# Patient Record
Sex: MALE | Race: WHITE | NOT HISPANIC OR LATINO | Employment: FULL TIME | URBAN - METROPOLITAN AREA
[De-identification: names, ages, dates, MRNs, and addresses within clinical notes are randomized per-mention and may not be internally consistent; named-entity substitution may affect disease eponyms.]

---

## 2021-10-19 ENCOUNTER — TELEPHONE (OUTPATIENT)
Dept: CARDIOLOGY | Facility: MEDICAL CENTER | Age: 56
End: 2021-10-19

## 2021-10-19 DIAGNOSIS — I47.10 SVT (SUPRAVENTRICULAR TACHYCARDIA) (HCC): ICD-10-CM

## 2021-10-19 NOTE — TELEPHONE ENCOUNTER
Medical records have been requested from [facility] in [place]. Requested records in regards to stress test results, ECG tracings, any recent cardiac related medical records and OV notes. Fax: [phone], Phone: [phone].    Fax confirmation has been received and sent to scan through [facility].

## 2021-10-19 NOTE — TELEPHONE ENCOUNTER
Referral to EP placed. Echo and stress test ordered.     LVM for patient to notify. Mailed orders to patient.

## 2021-10-19 NOTE — TELEPHONE ENCOUNTER
Received form back from [facility] stating that the patient is not found within database. Please advise. Thank you

## 2021-10-19 NOTE — TELEPHONE ENCOUNTER
Patient called back, scheduled with Dr. Pickett next week. He states he had a stress test last week at [facility], will send a task to Dr. Pickett's MA to request records.    Transferred patient to image scheduling to schedule echocardiogram.

## 2021-10-19 NOTE — TELEPHONE ENCOUNTER
Called [facility] medical records department, and after waiting on hold, representative came back on the line and said she found the patient's records and will have another representative call me back to confirm they have been sent to us.

## 2021-10-19 NOTE — TELEPHONE ENCOUNTER
----- Message from Ravinder Mac M.D. sent at 10/19/2021  6:45 AM PDT -----  Please make new patient consult with EP for this patient as requested by Taniya from [facility] for recurrent supraventricular tachycardia. If he could get an echocardiogram and myocardial perfusion imaging study prior to his consult, it would expedite care for him. Thanks.  JASKARAN

## 2021-10-22 NOTE — TELEPHONE ENCOUNTER
Haven't received records yet. Called medical records again [phone]. Representative said she would send the records to [phone]

## 2021-10-25 ENCOUNTER — OFFICE VISIT (OUTPATIENT)
Dept: CARDIOLOGY | Facility: MEDICAL CENTER | Age: 56
End: 2021-10-25
Payer: COMMERCIAL

## 2021-10-25 VITALS
HEIGHT: 73 IN | HEART RATE: 72 BPM | RESPIRATION RATE: 16 BRPM | OXYGEN SATURATION: 96 % | WEIGHT: 241 LBS | SYSTOLIC BLOOD PRESSURE: 140 MMHG | BODY MASS INDEX: 31.94 KG/M2 | DIASTOLIC BLOOD PRESSURE: 100 MMHG

## 2021-10-25 DIAGNOSIS — Z82.49 FHX: SVT (SUPRAVENTRICULAR TACHYCARDIA): ICD-10-CM

## 2021-10-25 LAB — EKG IMPRESSION: NORMAL

## 2021-10-25 PROCEDURE — 99204 OFFICE O/P NEW MOD 45 MIN: CPT | Performed by: INTERNAL MEDICINE

## 2021-10-25 PROCEDURE — 93000 ELECTROCARDIOGRAM COMPLETE: CPT | Performed by: INTERNAL MEDICINE

## 2021-10-25 RX ORDER — IBUPROFEN 200 MG
200 TABLET ORAL EVERY 6 HOURS PRN
COMMUNITY
End: 2021-11-20

## 2021-10-25 NOTE — PROGRESS NOTES
Arrhythmia Clinic Note (New patient)     DOS: 10/25/2021    Referring physician: Dr Mac    Chief complaint/Reason for consult: SVT    HPI: 56-year-old man with new onset SVT.  First episode was 2 weeks ago.  He went to the emergency room at [facility].  He was having incessant recurrent SVT and was started on amiodarone.  He was eventually taken to [place] because the regional beds were awful.  He was discharged without intervention there after a day or 2, per his report.  Unfortunately he was back in the emergency room a week later with recurrent SVT.  This was vagal sensitive.  He was then discharged on metoprolol and instructed to follow-up with cardiology.  He was taking metoprolol daily but now is taking it twice daily.  He has not had any events over the past week.    ROS (+ highlighted in bold):  Constitutional: Fevers/chills/fatigue/weightloss  HEENT: Blurry vision/eye pain/sore throat/hearing loss  Respiratory: Shortness of breath/cough  Cardiovascular: Chest pain/palpitations/edema/orthopnea/syncope  GI: Nausea/vomitting/diarrhea  MSK: Arthralgias/myagias/muscle weakness  Skin: Rash/sores  Neurological: Numbness/tremors/vertigo  Endocrine: Excessive thirst/polyuria/cold intolerance/heat intolerance  Psych: Depression/anxiety    No past medical history on file.    Past Surgical History:   Procedure Laterality Date   • OTHER ORTHOPEDIC SURGERY      right knee       Social History     Socioeconomic History   • Marital status:      Spouse name: Not on file   • Number of children: Not on file   • Years of education: Not on file   • Highest education level: Not on file   Occupational History   • Not on file   Tobacco Use   • Smoking status: Never Smoker   • Smokeless tobacco: Never Used   Vaping Use   • Vaping Use: Never used   Substance and Sexual Activity   • Alcohol use: Yes   • Drug use: Never   • Sexual activity: Not on file   Other Topics Concern   • Not on file   Social  "History Narrative   • Not on file     Social Determinants of Health     Financial Resource Strain:    • Difficulty of Paying Living Expenses:    Food Insecurity:    • Worried About Running Out of Food in the Last Year:    • Ran Out of Food in the Last Year:    Transportation Needs:    • Lack of Transportation (Medical):    • Lack of Transportation (Non-Medical):    Physical Activity:    • Days of Exercise per Week:    • Minutes of Exercise per Session:    Stress:    • Feeling of Stress :    Social Connections:    • Frequency of Communication with Friends and Family:    • Frequency of Social Gatherings with Friends and Family:    • Attends Jehovah's witness Services:    • Active Member of Clubs or Organizations:    • Attends Club or Organization Meetings:    • Marital Status:    Intimate Partner Violence:    • Fear of Current or Ex-Partner:    • Emotionally Abused:    • Physically Abused:    • Sexually Abused:        No family history on file.    No Known Allergies    Current Outpatient Medications   Medication Sig Dispense Refill   • ibuprofen (MOTRIN) 200 MG Tab Take 200 mg by mouth every 6 hours as needed.     • metoprolol tartrate (LOPRESSOR) 50 MG Tab Take 50 mg by mouth every day.     • aspirin (ASA) 81 MG Chew Tab chewable tablet Chew 81 mg every day.       No current facility-administered medications for this visit.       Physical Exam:  Vitals:    10/25/21 0822   BP: 140/100   BP Location: Left arm   Patient Position: Sitting   BP Cuff Size: Adult   Pulse: 72   Resp: 16   SpO2: 96%   Weight: 109 kg (241 lb)   Height: 1.854 m (6' 1\")     General appearance: NAD, conversant   Eyes: anicteric sclerae, moist conjunctivae; no lid-lag; PERRLA  HENT: Atraumatic; oropharynx clear with moist mucous membranes and no mucosal ulcerations; normal hard and soft palate  Neck: Trachea midline; FROM, supple, no thyromegaly or lymphadenopathy  Lungs: CTA, with normal respiratory effort and no intercostal retractions  CV: RRR, no " MRGs, no JVD   Abdomen: Soft, non-tender; no masses or HSM  Extremities: No peripheral edema or extremity lymphadenopathy  Skin: Normal temperature, turgor and texture; no rash, ulcers or subcutaneous nodules  Psych: Appropriate affect, alert and oriented to person, place and time    Data:  Lipids:   No results found for: CHOLSTRLTOT, TRIGLYCERIDE, HDL, LDL     BMP:  Lab Results   Component Value Date/Time    SODIUM 141 10/19/2021 0502    POTASSIUM 4.2 10/19/2021 0502    CHLORIDE 106 10/19/2021 0502    CO2 23 10/19/2021 0502    GLUCOSE 140 (H) 10/19/2021 0502    BUN 17 10/19/2021 0502    CREATININE 1.0 10/19/2021 0502    CALCIUM 8.9 10/19/2021 0502    ANION 16 10/19/2021 0502        TSH:   Lab Results   Component Value Date/Time    TSHULTRASEN 7.01 (H) 10/11/2021 0750        THYROXINE (T4):   No results found for: FREEDIR     CBC:   Lab Results   Component Value Date/Time    WBC 8.6 10/19/2021 05:02 AM    RBC 4.82 10/19/2021 05:02 AM    HEMOGLOBIN 14.5 10/19/2021 05:02 AM    HEMATOCRIT 43.5 10/19/2021 05:02 AM    MCV 90.2 10/19/2021 05:02 AM    MCH 30.1 10/19/2021 05:02 AM    MCHC 33.3 10/19/2021 05:02 AM    RDW 13.4 10/19/2021 05:02 AM    PLATELETCT 241 10/19/2021 05:02 AM    MPV 9.4 10/19/2021 05:02 AM        CBC w/o DIFF  Lab Results   Component Value Date/Time    WBC 8.6 10/19/2021 05:02 AM    RBC 4.82 10/19/2021 05:02 AM    HEMOGLOBIN 14.5 10/19/2021 05:02 AM    MCV 90.2 10/19/2021 05:02 AM    MCH 30.1 10/19/2021 05:02 AM    MCHC 33.3 10/19/2021 05:02 AM    RDW 13.4 10/19/2021 05:02 AM    MPV 9.4 10/19/2021 05:02 AM         EKG interpreted by me: EKG dated 10/11/2021 individually interpreted by me: SVT    EKG today interpreted by me: Normal sinus rhythm    Impression/Plan:  1.  Paroxysmal SVT    -We discussed treatment options for this at length.  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  We discussed indefinite beta-blocker therapy for suppression of arrhythmia, versus electrophysiology study and catheter ablation.  He would  potentially like to not take medications long-term but would like to discuss this with his wife.  Risks and benefits were discussed. Risks include vascular access bleeding or pain, infection, stroke, myocardial infarction, cardiac tamponade, pericardial effusion, myocardial perforation, major bleeding, phrenic nerve damage, heart block requiring a pacemaker, and death. Risk of major adverse event is ~1%. Success rates long term are 80-95% depending on the arrhythmia induced and the acute success in the EP lab.    Ultimately he does believe he would like to schedule a catheter ablation, and he will call us when he is ready to schedule.  If he has a change of heart however, I will see him again in 3 or 4 months.    Michael Pickett MD  Cardiac Electrophysiology

## 2021-10-27 ENCOUNTER — TELEPHONE (OUTPATIENT)
Dept: CARDIOLOGY | Facility: MEDICAL CENTER | Age: 56
End: 2021-10-27

## 2021-10-27 NOTE — TELEPHONE ENCOUNTER
Dr. Pickett,    This patient would like to proceed with schedule the ablation discussed in your last office visit. Are there any medications you would like this patient to hold for this SVT ablation?    Thank You,  Ela

## 2021-10-28 DIAGNOSIS — Z82.49 FHX: SVT (SUPRAVENTRICULAR TACHYCARDIA): ICD-10-CM

## 2021-10-28 NOTE — TELEPHONE ENCOUNTER
Michael Pickett M.D.  Ela Jimenez, Med Ass't  Caller: Unspecified (Yesterday,  1:16 PM)  Please hold metoprolol 5 days thanks

## 2021-10-28 NOTE — TELEPHONE ENCOUNTER
Patient scheduled for SVT ablation on 12-3-21 with Dr. Pickett. Patient has been instructed to check in at 7:30 for 9:30 case time. Hold Metoprolol 5 days before. Message sent to authorizations. Emailed Carto.

## 2021-11-17 ENCOUNTER — PRE-ADMISSION TESTING (OUTPATIENT)
Dept: ADMISSIONS | Facility: MEDICAL CENTER | Age: 56
End: 2021-11-17
Attending: INTERNAL MEDICINE
Payer: COMMERCIAL

## 2021-11-19 ENCOUNTER — TELEPHONE (OUTPATIENT)
Dept: CARDIOLOGY | Facility: MEDICAL CENTER | Age: 56
End: 2021-11-19

## 2021-11-19 NOTE — TELEPHONE ENCOUNTER
Dr. Pickett,    This patient is scheduled for a SVT ablation with you on 12-3-21. He has been instructed to hold his Metoprolol 5 days before. He wanted to let you know that he did have another episode yesterday that took him to the hospital. He was running 194, on the Metoprolol. This is happening, do you still want him to hold the Metoprolol before this procedure?    Please advise.    Thank You,  Ela

## 2021-11-21 ENCOUNTER — PATIENT MESSAGE (OUTPATIENT)
Dept: CARDIOLOGY | Facility: MEDICAL CENTER | Age: 56
End: 2021-11-21

## 2021-11-22 NOTE — TELEPHONE ENCOUNTER
Michael Pickett M.D.  Ela Jimenez, Med Ass't  Caller: Unspecified (3 days ago,  9:58 AM)  Let's hold metoprolol just 1 day thanks

## 2021-11-22 NOTE — TELEPHONE ENCOUNTER
LM on patient's voicemail notifying him to hold the Metoprolol only 1 day before the procedure now.Requeted a call back with any questions.

## 2021-12-03 ENCOUNTER — APPOINTMENT (OUTPATIENT)
Dept: CARDIOLOGY | Facility: MEDICAL CENTER | Age: 56
End: 2021-12-03
Attending: INTERNAL MEDICINE
Payer: COMMERCIAL

## 2021-12-03 ENCOUNTER — HOSPITAL ENCOUNTER (OUTPATIENT)
Facility: MEDICAL CENTER | Age: 56
End: 2021-12-03
Attending: INTERNAL MEDICINE | Admitting: INTERNAL MEDICINE
Payer: COMMERCIAL

## 2021-12-03 VITALS
DIASTOLIC BLOOD PRESSURE: 66 MMHG | RESPIRATION RATE: 24 BRPM | WEIGHT: 233.25 LBS | OXYGEN SATURATION: 94 % | HEIGHT: 73 IN | SYSTOLIC BLOOD PRESSURE: 117 MMHG | TEMPERATURE: 97.6 F | HEART RATE: 79 BPM | BODY MASS INDEX: 30.91 KG/M2

## 2021-12-03 DIAGNOSIS — Z82.49 FHX: SVT (SUPRAVENTRICULAR TACHYCARDIA): ICD-10-CM

## 2021-12-03 LAB
ALBUMIN SERPL BCP-MCNC: 4.7 G/DL (ref 3.2–4.9)
ALBUMIN/GLOB SERPL: 2.4 G/DL
ALP SERPL-CCNC: 86 U/L (ref 30–99)
ALT SERPL-CCNC: 17 U/L (ref 2–50)
ANION GAP SERPL CALC-SCNC: 10 MMOL/L (ref 7–16)
AST SERPL-CCNC: 10 U/L (ref 12–45)
BASOPHILS # BLD AUTO: 0.2 % (ref 0–1.8)
BASOPHILS # BLD: 0.02 K/UL (ref 0–0.12)
BILIRUB SERPL-MCNC: 0.9 MG/DL (ref 0.1–1.5)
BUN SERPL-MCNC: 21 MG/DL (ref 8–22)
CALCIUM SERPL-MCNC: 9.5 MG/DL (ref 8.5–10.5)
CHLORIDE SERPL-SCNC: 105 MMOL/L (ref 96–112)
CO2 SERPL-SCNC: 27 MMOL/L (ref 20–33)
CREAT SERPL-MCNC: 0.87 MG/DL (ref 0.5–1.4)
EKG IMPRESSION: NORMAL
EOSINOPHIL # BLD AUTO: 0.06 K/UL (ref 0–0.51)
EOSINOPHIL NFR BLD: 0.6 % (ref 0–6.9)
ERYTHROCYTE [DISTWIDTH] IN BLOOD BY AUTOMATED COUNT: 46.9 FL (ref 35.9–50)
EXTERNAL QUALITY CONTROL: NORMAL
GLOBULIN SER CALC-MCNC: 2 G/DL (ref 1.9–3.5)
GLUCOSE SERPL-MCNC: 115 MG/DL (ref 65–99)
HCT VFR BLD AUTO: 43.2 % (ref 42–52)
HGB BLD-MCNC: 14.5 G/DL (ref 14–18)
IMM GRANULOCYTES # BLD AUTO: 0.02 K/UL (ref 0–0.11)
IMM GRANULOCYTES NFR BLD AUTO: 0.2 % (ref 0–0.9)
INR PPP: 1.05 (ref 0.87–1.13)
LYMPHOCYTES # BLD AUTO: 2.51 K/UL (ref 1–4.8)
LYMPHOCYTES NFR BLD: 24.8 % (ref 22–41)
MCH RBC QN AUTO: 30.5 PG (ref 27–33)
MCHC RBC AUTO-ENTMCNC: 33.6 G/DL (ref 33.7–35.3)
MCV RBC AUTO: 90.9 FL (ref 81.4–97.8)
MONOCYTES # BLD AUTO: 0.77 K/UL (ref 0–0.85)
MONOCYTES NFR BLD AUTO: 7.6 % (ref 0–13.4)
NEUTROPHILS # BLD AUTO: 6.75 K/UL (ref 1.82–7.42)
NEUTROPHILS NFR BLD: 66.6 % (ref 44–72)
NRBC # BLD AUTO: 0 K/UL
NRBC BLD-RTO: 0 /100 WBC
PLATELET # BLD AUTO: 203 K/UL (ref 164–446)
PMV BLD AUTO: 9.5 FL (ref 9–12.9)
POTASSIUM SERPL-SCNC: 4.2 MMOL/L (ref 3.6–5.5)
PROT SERPL-MCNC: 6.7 G/DL (ref 6–8.2)
PROTHROMBIN TIME: 13.4 SEC (ref 12–14.6)
RBC # BLD AUTO: 4.75 M/UL (ref 4.7–6.1)
SARS-COV+SARS-COV-2 AG RESP QL IA.RAPID: NEGATIVE
SODIUM SERPL-SCNC: 142 MMOL/L (ref 135–145)
WBC # BLD AUTO: 10.1 K/UL (ref 4.8–10.8)

## 2021-12-03 PROCEDURE — 93623 PRGRMD STIMJ&PACG IV RX NFS: CPT

## 2021-12-03 PROCEDURE — 93621 COMP EP EVL L PAC&REC C SINS: CPT | Mod: 26 | Performed by: INTERNAL MEDICINE

## 2021-12-03 PROCEDURE — 93010 ELECTROCARDIOGRAM REPORT: CPT | Performed by: INTERNAL MEDICINE

## 2021-12-03 PROCEDURE — 93662 INTRACARDIAC ECG (ICE): CPT | Mod: 26 | Performed by: INTERNAL MEDICINE

## 2021-12-03 PROCEDURE — 93653 COMPRE EP EVAL TX SVT: CPT | Performed by: INTERNAL MEDICINE

## 2021-12-03 PROCEDURE — 93613 INTRACARDIAC EPHYS 3D MAPG: CPT | Performed by: INTERNAL MEDICINE

## 2021-12-03 PROCEDURE — 700101 HCHG RX REV CODE 250

## 2021-12-03 PROCEDURE — 85610 PROTHROMBIN TIME: CPT

## 2021-12-03 PROCEDURE — 160002 HCHG RECOVERY MINUTES (STAT)

## 2021-12-03 PROCEDURE — 93005 ELECTROCARDIOGRAM TRACING: CPT | Performed by: INTERNAL MEDICINE

## 2021-12-03 PROCEDURE — 93655 ICAR CATH ABLTJ DSCRT ARRHYT: CPT | Performed by: INTERNAL MEDICINE

## 2021-12-03 PROCEDURE — 80053 COMPREHEN METABOLIC PANEL: CPT

## 2021-12-03 PROCEDURE — 700111 HCHG RX REV CODE 636 W/ 250 OVERRIDE (IP)

## 2021-12-03 PROCEDURE — 99152 MOD SED SAME PHYS/QHP 5/>YRS: CPT | Performed by: INTERNAL MEDICINE

## 2021-12-03 PROCEDURE — 85025 COMPLETE CBC W/AUTO DIFF WBC: CPT

## 2021-12-03 PROCEDURE — 93623 PRGRMD STIMJ&PACG IV RX NFS: CPT | Mod: 26 | Performed by: INTERNAL MEDICINE

## 2021-12-03 PROCEDURE — 87426 SARSCOV CORONAVIRUS AG IA: CPT | Performed by: INTERNAL MEDICINE

## 2021-12-03 RX ORDER — MIDAZOLAM HYDROCHLORIDE 1 MG/ML
INJECTION INTRAMUSCULAR; INTRAVENOUS
Status: COMPLETED
Start: 2021-12-03 | End: 2021-12-03

## 2021-12-03 RX ORDER — HEPARIN SODIUM 200 [USP'U]/100ML
INJECTION, SOLUTION INTRAVENOUS
Status: COMPLETED
Start: 2021-12-03 | End: 2021-12-03

## 2021-12-03 RX ORDER — ISOPROTERENOL HYDROCHLORIDE 0.2 MG/ML
INJECTION, SOLUTION INTRAVENOUS
Status: COMPLETED
Start: 2021-12-03 | End: 2021-12-03

## 2021-12-03 RX ORDER — M-VIT,TX,IRON,MINS/CALC/FOLIC 27MG-0.4MG
1 TABLET ORAL DAILY
Status: ON HOLD | COMMUNITY
End: 2021-12-07

## 2021-12-03 RX ORDER — BUPIVACAINE HYDROCHLORIDE 2.5 MG/ML
INJECTION, SOLUTION EPIDURAL; INFILTRATION; INTRACAUDAL
Status: COMPLETED
Start: 2021-12-03 | End: 2021-12-03

## 2021-12-03 RX ORDER — LIDOCAINE HYDROCHLORIDE 20 MG/ML
INJECTION, SOLUTION INFILTRATION; PERINEURAL
Status: COMPLETED
Start: 2021-12-03 | End: 2021-12-03

## 2021-12-03 RX ADMIN — HEPARIN SODIUM 2000 UNITS: 200 INJECTION, SOLUTION INTRAVENOUS at 11:45

## 2021-12-03 RX ADMIN — MIDAZOLAM HYDROCHLORIDE 2 MG: 1 INJECTION, SOLUTION INTRAMUSCULAR; INTRAVENOUS at 11:52

## 2021-12-03 RX ADMIN — MIDAZOLAM HYDROCHLORIDE 2 MG: 1 INJECTION, SOLUTION INTRAMUSCULAR; INTRAVENOUS at 11:23

## 2021-12-03 RX ADMIN — LIDOCAINE HYDROCHLORIDE: 20 INJECTION, SOLUTION INFILTRATION; PERINEURAL at 11:23

## 2021-12-03 RX ADMIN — MIDAZOLAM HYDROCHLORIDE 2 MG: 1 INJECTION, SOLUTION INTRAMUSCULAR; INTRAVENOUS at 12:53

## 2021-12-03 RX ADMIN — ISOPROTERENOL HYDROCHLORIDE 200 MCG: 0.2 INJECTION, SOLUTION INTRAMUSCULAR; INTRAVENOUS at 11:42

## 2021-12-03 RX ADMIN — MIDAZOLAM HYDROCHLORIDE 2 MG: 1 INJECTION, SOLUTION INTRAMUSCULAR; INTRAVENOUS at 12:26

## 2021-12-03 RX ADMIN — HEPARIN SODIUM 2000 UNITS: 200 INJECTION, SOLUTION INTRAVENOUS at 11:43

## 2021-12-03 RX ADMIN — FENTANYL CITRATE 100 MCG: 50 INJECTION, SOLUTION INTRAMUSCULAR; INTRAVENOUS at 11:23

## 2021-12-03 RX ADMIN — BUPIVACAINE HYDROCHLORIDE: 2.5 INJECTION, SOLUTION EPIDURAL; INFILTRATION; INTRACAUDAL; PERINEURAL at 11:23

## 2021-12-03 RX ADMIN — FENTANYL CITRATE 100 MCG: 50 INJECTION, SOLUTION INTRAMUSCULAR; INTRAVENOUS at 12:26

## 2021-12-03 RX ADMIN — FENTANYL CITRATE 100 MCG: 50 INJECTION, SOLUTION INTRAMUSCULAR; INTRAVENOUS at 13:13

## 2021-12-03 RX ADMIN — FENTANYL CITRATE 100 MCG: 50 INJECTION, SOLUTION INTRAMUSCULAR; INTRAVENOUS at 11:52

## 2021-12-03 ASSESSMENT — FIBROSIS 4 INDEX: FIB4 SCORE: 1.08

## 2021-12-03 NOTE — PROGRESS NOTES
Med rec completed per PT at bedside  Interviewed PT with family at bedside with permission  Allergies reviewed and updated

## 2021-12-03 NOTE — H&P
[facility]  Electrophysiology Pre-procedure H&P    DOS:12/3/2021    Planned Procedure: SVT ablation    Chief complaint/Reason for Procedure: SVT    HPI: 57 y/o M with PSVT for EPS/RFA      Past Medical History:   Diagnosis Date   • Arrhythmia 11/17/2021    SVT   • Breath shortness 11/17/2021    SOB while sleeping   • Indigestion 11/17/2021    occ   • Pain 11/17/2021    L5-S1 disc   • Sleep apnea     Appointment on 11/23/21       Past Surgical History:   Procedure Laterality Date   • OTHER ORTHOPEDIC SURGERY      right knee       Social History     Socioeconomic History   • Marital status:      Spouse name: Not on file   • Number of children: Not on file   • Years of education: Not on file   • Highest education level: Not on file   Occupational History   • Not on file   Tobacco Use   • Smoking status: Never Smoker   • Smokeless tobacco: Never Used   Vaping Use   • Vaping Use: Never used   Substance and Sexual Activity   • Alcohol use: Yes     Comment: 6-8 per week   • Drug use: Never   • Sexual activity: Not on file   Other Topics Concern   • Not on file   Social History Narrative   • Not on file     Social Determinants of Health     Financial Resource Strain:    • Difficulty of Paying Living Expenses: Not on file   Food Insecurity:    • Worried About Running Out of Food in the Last Year: Not on file   • Ran Out of Food in the Last Year: Not on file   Transportation Needs:    • Lack of Transportation (Medical): Not on file   • Lack of Transportation (Non-Medical): Not on file   Physical Activity:    • Days of Exercise per Week: Not on file   • Minutes of Exercise per Session: Not on file   Stress:    • Feeling of Stress : Not on file   Social Connections:    • Frequency of Communication with Friends and Family: Not on file   • Frequency of Social Gatherings with Friends and Family: Not on file   • Attends Jewish Services: Not on file   • Active Member of Clubs or Organizations: Not on  "file   • Attends Club or Organization Meetings: Not on file   • Marital Status: Not on file   Intimate Partner Violence:    • Fear of Current or Ex-Partner: Not on file   • Emotionally Abused: Not on file   • Physically Abused: Not on file   • Sexually Abused: Not on file   Housing Stability:    • Unable to Pay for Housing in the Last Year: Not on file   • Number of Places Lived in the Last Year: Not on file   • Unstable Housing in the Last Year: Not on file       History reviewed. No pertinent family history.    No Known Allergies    No current facility-administered medications for this encounter.       Physical Exam:  Vitals:    12/03/21 0742   BP: 112/77   Pulse: 81   Resp: 16   Temp: 36.2 °C (97.1 °F)   TempSrc: Temporal   SpO2: 96%   Weight: 106 kg (233 lb 4 oz)   Height: 1.854 m (6' 1\")     General appearance: NAD, conversant   Neck: Trachea midline; FROM, supple, no thyromegaly or lymphadenopathy  CV: RRR, no MRGs, no JVD   Extremities: No peripheral edema or extremity lymphadenopathy  Skin: Normal temperature, turgor and texture; no rash, ulcers or subcutaneous nodules  Psych: Appropriate affect, alert and oriented to person, place and time    Data:  No results found for: CHOLSTRLTOT, LDL, HDL, TRIGLYCERIDE    Lab Results   Component Value Date/Time    SODIUM 142 12/03/2021 08:20 AM    POTASSIUM 4.2 12/03/2021 08:20 AM    CHLORIDE 105 12/03/2021 08:20 AM    CO2 27 12/03/2021 08:20 AM    GLUCOSE 115 (H) 12/03/2021 08:20 AM    BUN 21 12/03/2021 08:20 AM    CREATININE 0.87 12/03/2021 08:20 AM     Lab Results   Component Value Date/Time    ALKPHOSPHAT 86 12/03/2021 08:20 AM    ASTSGOT 10 (L) 12/03/2021 08:20 AM    ALTSGPT 17 12/03/2021 08:20 AM    TBILIRUBIN 0.9 12/03/2021 08:20 AM      No results found for: BNPBTYPENAT      Recent Labs     12/03/21  0820   WBC 10.1   RBC 4.75   HEMOGLOBIN 14.5   HEMATOCRIT 43.2   MCV 90.9   MCH 30.5   MCHC 33.6*   RDW 46.9   PLATELETCT 203   MPV 9.5       EKG interpreted by me: " NSR    Impression/Plan:  1) pSVT    Plan EPS/RFA, Risks include vascular access bleeding or pain, infection, stroke, myocardial infarction, cardiac tamponade, pericardial effusion, myocardial perforation, major bleeding, phrenic nerve damage, heart block requiring a pacemaker, and death. Risk of major adverse event is ~1%. Success rates long term are 80-95% depending on the arrhythmia induced and the acute success in the EP lab.        Michael Pickett MD  Cardiac Electrophysiology

## 2021-12-03 NOTE — OR NURSING
1329: Pt arrived from cath lab post ablation. Pt is awake, alert, and oriented. R groin sight is CDI; pt on bedrest. Cardiac rhythm appears to be SR. Pt denies pain or nausea.     1445: Left VM with callback for pt's wife. Pt resting. RN updated YORDAN Jalloh on pt status and location.     1500: Tacoseh to bedside. Discussed POC.     1522: Pt up to 30 degrees and tolerating food. Awaiting phase II room.     1600: Report to Coreen; pt to phase II room 30 via milady with RN.

## 2021-12-04 NOTE — OR NURSING
Discharge order in place. Pt's VSS; denies N/V; states pain is at tolerable level. Dressing C/D/I to R groin. Pt up and ambulated to bathroom, steady gait, voided adequately. Discharge instructions given; pt and family verbalized understanding and questions answered. Pt changed into clothing with assistance. IV d/c'd. Patient states ready to d/c home. No prescriptions sent to pharmacy. Pt dc'd home in wheelchair by CNA.

## 2021-12-04 NOTE — DISCHARGE INSTRUCTIONS
ACTIVITY: Rest and take it easy for the first 24 hours.  A responsible adult is recommended to remain with you during that time.  It is normal to feel sleepy.  We encourage you to not do anything that requires balance, judgment or coordination.    MILD FLU-LIKE SYMPTOMS ARE NORMAL. YOU MAY EXPERIENCE GENERALIZED MUSCLE ACHES, THROAT IRRITATION, HEADACHE AND/OR SOME NAUSEA.    FOR 24 HOURS DO NOT:  Drive, operate machinery or run household appliances.  Drink beer or alcoholic beverages.   Make important decisions or sign legal documents.    Post Ablation Instructions:   No lifting > 10 lbs x 1 week.    No baths or hot tubs x 1 week.   May shower tomorrow and take off dressings.    Continue to monitor sites daily for warmth, redness, or discolored drainage.    Please walk and take deep breaths.   After discharge if you experience chest pain, shortness of breath, coughing up blood, stroke symptoms, fever > 101F, passing out/near passing out, or trouble with the catheter sites, please be seen in the emergency dept.  Please call our office at [phone] for atrial fibrillation lasting longer than 2 hours or if you develop signs or symptoms of a urinary tract infection (pain or burning during urination, frequency, urgency, drainage, fever).    DIET: To avoid nausea, slowly advance diet as tolerated, avoiding spicy or greasy foods for the first day.  Add more substantial food to your diet according to your physician's instructions.  Babies can be fed formula or breast milk as soon as they are hungry.  INCREASE FLUIDS AND FIBER TO AVOID CONSTIPATION.    FOLLOW-UP APPOINTMENT:  A follow-up appointment should be arranged with your doctor; call to schedule.    You should CALL YOUR PHYSICIAN if you develop:  Fever greater than 101 degrees F.  Pain not relieved by medication, or persistent nausea or vomiting.  Excessive bleeding (blood soaking through dressing) or unexpected drainage from the wound.  Extreme redness or  swelling around the incision site, drainage of pus or foul smelling drainage.  Inability to urinate or empty your bladder within 8 hours.  Problems with breathing or chest pain.    You should call 911 if you develop problems with breathing or chest pain.  If you are unable to contact your doctor or surgical center, you should go to the nearest emergency room or urgent care center.    If any questions arise, call your doctor.  If your doctor is not available, please feel free to call the Surgical Center at [phone].     A registered nurse may call you a few days after your surgery to see how you are doing after your procedure.    MEDICATIONS: Resume taking daily medication.  Take prescribed pain medication with food.  If no medication is prescribed, you may take non-aspirin pain medication if needed.  PAIN MEDICATION CAN BE VERY CONSTIPATING.  Take a stool softener or laxative such as senokot, pericolace, or milk of magnesia if needed.    Depression / Suicide Risk    As you are discharged from this [facility] facility, it is important to learn how to keep safe from harming yourself.    Recognize the warning signs:  · Abrupt changes in personality, positive or negative- including increase in energy   · Giving away possessions  · Change in eating patterns- significant weight changes-  positive or negative  · Change in sleeping patterns- unable to sleep or sleeping all the time   · Unwillingness or inability to communicate  · Depression  · Unusual sadness, discouragement and loneliness  · Talk of wanting to die  · Neglect of personal appearance   · Rebelliousness- reckless behavior  · Withdrawal from people/activities they love  · Confusion- inability to concentrate     If you or a loved one observes any of these behaviors or has concerns about self-harm, here's what you can do:  · Talk about it- your feelings and reasons for harming yourself  · Remove any means that you might use to hurt yourself (examples:  pills, rope, extension cords, firearm)  · Get professional help from the community (Mental Health, Substance Abuse, psychological counseling)  · Do not be alone:Call your Safe Contact- someone whom you trust who will be there for you.  · Call your local CRISIS HOTLINE 725-1195 or 435-610-0131  · Call your local Children's Mobile Crisis Response Team [place] (045) 146-5968 or www.[employer]  · Call the toll free National Suicide Prevention Hotlines   · National Suicide Prevention Lifeline 969-518-MIEN (1288)  · National Hope Line Network 800-SUICIDE (367-2713)

## 2021-12-05 ENCOUNTER — TELEPHONE (OUTPATIENT)
Dept: CARDIOLOGY | Facility: MEDICAL CENTER | Age: 56
End: 2021-12-05

## 2021-12-05 ENCOUNTER — HOSPITAL ENCOUNTER (INPATIENT)
Facility: MEDICAL CENTER | Age: 56
LOS: 2 days | DRG: 310 | End: 2021-12-07
Attending: STUDENT IN AN ORGANIZED HEALTH CARE EDUCATION/TRAINING PROGRAM | Admitting: STUDENT IN AN ORGANIZED HEALTH CARE EDUCATION/TRAINING PROGRAM
Payer: COMMERCIAL

## 2021-12-05 PROCEDURE — 770020 HCHG ROOM/CARE - TELE (206)

## 2021-12-05 ASSESSMENT — FIBROSIS 4 INDEX: FIB4 SCORE: 1.98

## 2021-12-06 ENCOUNTER — APPOINTMENT (OUTPATIENT)
Dept: CARDIOLOGY | Facility: MEDICAL CENTER | Age: 56
DRG: 310 | End: 2021-12-06
Attending: STUDENT IN AN ORGANIZED HEALTH CARE EDUCATION/TRAINING PROGRAM
Payer: COMMERCIAL

## 2021-12-06 ENCOUNTER — TELEPHONE (OUTPATIENT)
Dept: CARDIOLOGY | Facility: MEDICAL CENTER | Age: 56
End: 2021-12-06

## 2021-12-06 PROBLEM — I95.9 HYPOTENSION: Status: ACTIVE | Noted: 2021-12-06

## 2021-12-06 PROBLEM — I49.9 ARRHYTHMIA: Status: ACTIVE | Noted: 2021-12-06

## 2021-12-06 PROBLEM — I47.10 SVT (SUPRAVENTRICULAR TACHYCARDIA) (HCC): Status: ACTIVE | Noted: 2021-12-06

## 2021-12-06 LAB
ALBUMIN SERPL BCP-MCNC: 4.1 G/DL (ref 3.2–4.9)
ALBUMIN/GLOB SERPL: 1.9 G/DL
ALP SERPL-CCNC: 151 U/L (ref 30–99)
ALT SERPL-CCNC: 58 U/L (ref 2–50)
ANION GAP SERPL CALC-SCNC: 10 MMOL/L (ref 7–16)
AST SERPL-CCNC: 32 U/L (ref 12–45)
BILIRUB SERPL-MCNC: 0.5 MG/DL (ref 0.1–1.5)
BUN SERPL-MCNC: 19 MG/DL (ref 8–22)
CALCIUM SERPL-MCNC: 8.9 MG/DL (ref 8.5–10.5)
CHLORIDE SERPL-SCNC: 108 MMOL/L (ref 96–112)
CO2 SERPL-SCNC: 22 MMOL/L (ref 20–33)
CREAT SERPL-MCNC: 0.7 MG/DL (ref 0.5–1.4)
ERYTHROCYTE [DISTWIDTH] IN BLOOD BY AUTOMATED COUNT: 46.8 FL (ref 35.9–50)
EST. AVERAGE GLUCOSE BLD GHB EST-MCNC: 103 MG/DL
GLOBULIN SER CALC-MCNC: 2.2 G/DL (ref 1.9–3.5)
GLUCOSE BLD-MCNC: 101 MG/DL (ref 65–99)
GLUCOSE BLD-MCNC: 111 MG/DL (ref 65–99)
GLUCOSE BLD-MCNC: 147 MG/DL (ref 65–99)
GLUCOSE BLD-MCNC: 95 MG/DL (ref 65–99)
GLUCOSE BLD-MCNC: 97 MG/DL (ref 65–99)
GLUCOSE SERPL-MCNC: 111 MG/DL (ref 65–99)
HBA1C MFR BLD: 5.2 % (ref 4–5.6)
HCT VFR BLD AUTO: 37.4 % (ref 42–52)
HGB BLD-MCNC: 12.8 G/DL (ref 14–18)
LV EJECT FRACT  99904: 60
LV EJECT FRACT MOD 2C 99903: 60.83
LV EJECT FRACT MOD 4C 99902: 62.5
LV EJECT FRACT MOD BP 99901: 60.31
MCH RBC QN AUTO: 30.8 PG (ref 27–33)
MCHC RBC AUTO-ENTMCNC: 34.2 G/DL (ref 33.7–35.3)
MCV RBC AUTO: 90.1 FL (ref 81.4–97.8)
NT-PROBNP SERPL IA-MCNC: 1961 PG/ML (ref 0–125)
PLATELET # BLD AUTO: 185 K/UL (ref 164–446)
PMV BLD AUTO: 9.6 FL (ref 9–12.9)
POTASSIUM SERPL-SCNC: 4.1 MMOL/L (ref 3.6–5.5)
PROT SERPL-MCNC: 6.3 G/DL (ref 6–8.2)
RBC # BLD AUTO: 4.15 M/UL (ref 4.7–6.1)
SODIUM SERPL-SCNC: 140 MMOL/L (ref 135–145)
T4 FREE SERPL-MCNC: 1.19 NG/DL (ref 0.93–1.7)
TROPONIN T SERPL-MCNC: 54 NG/L (ref 6–19)
TROPONIN T SERPL-MCNC: 55 NG/L (ref 6–19)
TROPONIN T SERPL-MCNC: 63 NG/L (ref 6–19)
TSH SERPL DL<=0.005 MIU/L-ACNC: 4.64 UIU/ML (ref 0.38–5.33)
WBC # BLD AUTO: 8.6 K/UL (ref 4.8–10.8)

## 2021-12-06 PROCEDURE — 770020 HCHG ROOM/CARE - TELE (206)

## 2021-12-06 PROCEDURE — 93306 TTE W/DOPPLER COMPLETE: CPT

## 2021-12-06 PROCEDURE — A9270 NON-COVERED ITEM OR SERVICE: HCPCS | Performed by: STUDENT IN AN ORGANIZED HEALTH CARE EDUCATION/TRAINING PROGRAM

## 2021-12-06 PROCEDURE — 700102 HCHG RX REV CODE 250 W/ 637 OVERRIDE(OP): Performed by: PHYSICIAN ASSISTANT

## 2021-12-06 PROCEDURE — 93306 TTE W/DOPPLER COMPLETE: CPT | Mod: 26 | Performed by: INTERNAL MEDICINE

## 2021-12-06 PROCEDURE — 97535 SELF CARE MNGMENT TRAINING: CPT

## 2021-12-06 PROCEDURE — 700102 HCHG RX REV CODE 250 W/ 637 OVERRIDE(OP): Performed by: STUDENT IN AN ORGANIZED HEALTH CARE EDUCATION/TRAINING PROGRAM

## 2021-12-06 PROCEDURE — 99232 SBSQ HOSP IP/OBS MODERATE 35: CPT | Mod: GC | Performed by: HOSPITALIST

## 2021-12-06 PROCEDURE — 84484 ASSAY OF TROPONIN QUANT: CPT

## 2021-12-06 PROCEDURE — 83036 HEMOGLOBIN GLYCOSYLATED A1C: CPT

## 2021-12-06 PROCEDURE — 80053 COMPREHEN METABOLIC PANEL: CPT

## 2021-12-06 PROCEDURE — 84439 ASSAY OF FREE THYROXINE: CPT

## 2021-12-06 PROCEDURE — A9270 NON-COVERED ITEM OR SERVICE: HCPCS | Performed by: PHYSICIAN ASSISTANT

## 2021-12-06 PROCEDURE — 99223 1ST HOSP IP/OBS HIGH 75: CPT | Performed by: STUDENT IN AN ORGANIZED HEALTH CARE EDUCATION/TRAINING PROGRAM

## 2021-12-06 PROCEDURE — 82962 GLUCOSE BLOOD TEST: CPT | Mod: 91

## 2021-12-06 PROCEDURE — 85027 COMPLETE CBC AUTOMATED: CPT

## 2021-12-06 PROCEDURE — 84443 ASSAY THYROID STIM HORMONE: CPT

## 2021-12-06 PROCEDURE — 700111 HCHG RX REV CODE 636 W/ 250 OVERRIDE (IP): Performed by: STUDENT IN AN ORGANIZED HEALTH CARE EDUCATION/TRAINING PROGRAM

## 2021-12-06 PROCEDURE — 83880 ASSAY OF NATRIURETIC PEPTIDE: CPT

## 2021-12-06 PROCEDURE — 36415 COLL VENOUS BLD VENIPUNCTURE: CPT

## 2021-12-06 PROCEDURE — 99222 1ST HOSP IP/OBS MODERATE 55: CPT | Performed by: STUDENT IN AN ORGANIZED HEALTH CARE EDUCATION/TRAINING PROGRAM

## 2021-12-06 RX ORDER — METOPROLOL TARTRATE 1 MG/ML
5 INJECTION, SOLUTION INTRAVENOUS
Status: DISCONTINUED | OUTPATIENT
Start: 2021-12-06 | End: 2021-12-07 | Stop reason: HOSPADM

## 2021-12-06 RX ORDER — BISACODYL 10 MG
10 SUPPOSITORY, RECTAL RECTAL
Status: DISCONTINUED | OUTPATIENT
Start: 2021-12-06 | End: 2021-12-07 | Stop reason: HOSPADM

## 2021-12-06 RX ORDER — PROMETHAZINE HYDROCHLORIDE 25 MG/1
12.5-25 SUPPOSITORY RECTAL EVERY 4 HOURS PRN
Status: DISCONTINUED | OUTPATIENT
Start: 2021-12-06 | End: 2021-12-07 | Stop reason: HOSPADM

## 2021-12-06 RX ORDER — POLYETHYLENE GLYCOL 3350 17 G/17G
1 POWDER, FOR SOLUTION ORAL
Status: DISCONTINUED | OUTPATIENT
Start: 2021-12-06 | End: 2021-12-07 | Stop reason: HOSPADM

## 2021-12-06 RX ORDER — AMOXICILLIN 250 MG
2 CAPSULE ORAL 2 TIMES DAILY
Status: DISCONTINUED | OUTPATIENT
Start: 2021-12-06 | End: 2021-12-07 | Stop reason: HOSPADM

## 2021-12-06 RX ORDER — ADENOSINE 3 MG/ML
6 INJECTION, SOLUTION INTRAVENOUS
Status: DISCONTINUED | OUTPATIENT
Start: 2021-12-06 | End: 2021-12-07 | Stop reason: HOSPADM

## 2021-12-06 RX ORDER — HEPARIN SODIUM 5000 [USP'U]/ML
5000 INJECTION, SOLUTION INTRAVENOUS; SUBCUTANEOUS EVERY 8 HOURS
Status: DISCONTINUED | OUTPATIENT
Start: 2021-12-06 | End: 2021-12-06

## 2021-12-06 RX ORDER — FLECAINIDE ACETATE 100 MG/1
50 TABLET ORAL TWICE DAILY
Status: DISCONTINUED | OUTPATIENT
Start: 2021-12-06 | End: 2021-12-07 | Stop reason: HOSPADM

## 2021-12-06 RX ORDER — ONDANSETRON 4 MG/1
4 TABLET, ORALLY DISINTEGRATING ORAL EVERY 4 HOURS PRN
Status: DISCONTINUED | OUTPATIENT
Start: 2021-12-06 | End: 2021-12-07 | Stop reason: HOSPADM

## 2021-12-06 RX ORDER — DEXTROSE MONOHYDRATE 25 G/50ML
50 INJECTION, SOLUTION INTRAVENOUS
Status: DISCONTINUED | OUTPATIENT
Start: 2021-12-06 | End: 2021-12-07 | Stop reason: HOSPADM

## 2021-12-06 RX ORDER — ENALAPRILAT 1.25 MG/ML
1.25 INJECTION INTRAVENOUS EVERY 6 HOURS PRN
Status: DISCONTINUED | OUTPATIENT
Start: 2021-12-06 | End: 2021-12-07 | Stop reason: HOSPADM

## 2021-12-06 RX ORDER — PROCHLORPERAZINE EDISYLATE 5 MG/ML
5-10 INJECTION INTRAMUSCULAR; INTRAVENOUS EVERY 4 HOURS PRN
Status: DISCONTINUED | OUTPATIENT
Start: 2021-12-06 | End: 2021-12-07 | Stop reason: HOSPADM

## 2021-12-06 RX ORDER — ONDANSETRON 2 MG/ML
4 INJECTION INTRAMUSCULAR; INTRAVENOUS EVERY 4 HOURS PRN
Status: DISCONTINUED | OUTPATIENT
Start: 2021-12-06 | End: 2021-12-07 | Stop reason: HOSPADM

## 2021-12-06 RX ORDER — LABETALOL HYDROCHLORIDE 5 MG/ML
10 INJECTION, SOLUTION INTRAVENOUS EVERY 4 HOURS PRN
Status: DISCONTINUED | OUTPATIENT
Start: 2021-12-06 | End: 2021-12-07 | Stop reason: HOSPADM

## 2021-12-06 RX ORDER — METOPROLOL TARTRATE 50 MG/1
100 TABLET, FILM COATED ORAL 2 TIMES DAILY
Status: DISCONTINUED | OUTPATIENT
Start: 2021-12-06 | End: 2021-12-06

## 2021-12-06 RX ORDER — PROMETHAZINE HYDROCHLORIDE 25 MG/1
12.5-25 TABLET ORAL EVERY 4 HOURS PRN
Status: DISCONTINUED | OUTPATIENT
Start: 2021-12-06 | End: 2021-12-07 | Stop reason: HOSPADM

## 2021-12-06 RX ADMIN — HEPARIN SODIUM 5000 UNITS: 5000 INJECTION, SOLUTION INTRAVENOUS; SUBCUTANEOUS at 05:36

## 2021-12-06 RX ADMIN — FLECAINIDE ACETATE 50 MG: 100 TABLET ORAL at 17:58

## 2021-12-06 RX ADMIN — METOPROLOL TARTRATE 75 MG: 50 TABLET, FILM COATED ORAL at 17:59

## 2021-12-06 RX ADMIN — METOPROLOL TARTRATE 100 MG: 50 TABLET, FILM COATED ORAL at 05:35

## 2021-12-06 RX ADMIN — ASPIRIN 81 MG: 81 TABLET, COATED ORAL at 05:36

## 2021-12-06 RX ADMIN — SENNOSIDES AND DOCUSATE SODIUM 2 TABLET: 50; 8.6 TABLET ORAL at 05:35

## 2021-12-06 RX ADMIN — ENOXAPARIN SODIUM 40 MG: 40 INJECTION SUBCUTANEOUS at 12:28

## 2021-12-06 ASSESSMENT — ENCOUNTER SYMPTOMS
WEAKNESS: 0
ORTHOPNEA: 0
CONSTIPATION: 0
CHILLS: 0
PND: 0
COUGH: 0
DIZZINESS: 0
SEIZURES: 0
HEADACHES: 0
HEARTBURN: 0
PALPITATIONS: 0
FEVER: 0
TREMORS: 0
SPUTUM PRODUCTION: 0
TINGLING: 0
DOUBLE VISION: 0
BLURRED VISION: 0
NAUSEA: 0
HEMOPTYSIS: 0
SHORTNESS OF BREATH: 0
ABDOMINAL PAIN: 0
DIARRHEA: 0

## 2021-12-06 ASSESSMENT — LIFESTYLE VARIABLES
ON A TYPICAL DAY WHEN YOU DRINK ALCOHOL HOW MANY DRINKS DO YOU HAVE: 0
CONSUMPTION TOTAL: NEGATIVE
TOTAL SCORE: 0
AVERAGE NUMBER OF DAYS PER WEEK YOU HAVE A DRINK CONTAINING ALCOHOL: 0
ALCOHOL_USE: NO
TOTAL SCORE: 0
HOW MANY TIMES IN THE PAST YEAR HAVE YOU HAD 5 OR MORE DRINKS IN A DAY: 0
EVER HAD A DRINK FIRST THING IN THE MORNING TO STEADY YOUR NERVES TO GET RID OF A HANGOVER: NO
EVER FELT BAD OR GUILTY ABOUT YOUR DRINKING: NO
HAVE YOU EVER FELT YOU SHOULD CUT DOWN ON YOUR DRINKING: NO
DOES PATIENT WANT TO STOP DRINKING: NO
HAVE PEOPLE ANNOYED YOU BY CRITICIZING YOUR DRINKING: NO
TOTAL SCORE: 0

## 2021-12-06 ASSESSMENT — PATIENT HEALTH QUESTIONNAIRE - PHQ9
SUM OF ALL RESPONSES TO PHQ9 QUESTIONS 1 AND 2: 0
2. FEELING DOWN, DEPRESSED, IRRITABLE, OR HOPELESS: NOT AT ALL
1. LITTLE INTEREST OR PLEASURE IN DOING THINGS: NOT AT ALL

## 2021-12-06 ASSESSMENT — COGNITIVE AND FUNCTIONAL STATUS - GENERAL
DAILY ACTIVITIY SCORE: 24
MOBILITY SCORE: 24
SUGGESTED CMS G CODE MODIFIER MOBILITY: CH
SUGGESTED CMS G CODE MODIFIER DAILY ACTIVITY: CH

## 2021-12-06 ASSESSMENT — PAIN DESCRIPTION - PAIN TYPE: TYPE: ACUTE PAIN

## 2021-12-06 NOTE — PROGRESS NOTES
Received report from day shift RN, assumed pt care. A&O x 4. No report of pain. Fall precautions in place. Call light and bedside table within reach. Assessment completed. Will continue to monitor.   Patient NPO pending consults.

## 2021-12-06 NOTE — ASSESSMENT & PLAN NOTE
S/p Ablation 3 days prior  Found to be in SVT at outside facility and transferred to [facility] after he converted to NSR with IVF and 12 of adenosine.  Telemetry monitoring  Vagal maneurvers & Adenosine prn for svt  Continue home med Lopressor 100mg BID  Trend Troponin I

## 2021-12-06 NOTE — DISCHARGE PLANNING
Anticipated Discharge Disposition: Home    Action: pt pending medical clearance, 6 clicks are 24, pt on room air, no case management needs identified.    Barriers to Discharge: medical clearance    Plan: f/u with medical team and pt to discuss dc needs and barriers.

## 2021-12-06 NOTE — TELEPHONE ENCOUNTER
I was contacted by the transfer center to discuss the care of Mr. Griffiths who called me earlier with recurrent symptomatic rapid heart rates.  Patient took extra doses of home medications without success.  He presented to the emergency room in [place] at [facility] where he converted after 2 doses of adenosine.  Patient was reported to converted, then went back in SVT, then became profoundly hypotensive with a systolic blood pressure in the 70s, then converted to sinus rhythm.    High-sensitivity troponin in [place] which is different for [facility] came back at 78, normal value being 60.    Emergency provider requested transfer to [facility] if he was not comfortable discharging the patient with close cardiology follow-up.  I have accepted the patient for transfer.

## 2021-12-06 NOTE — PROGRESS NOTES
Daily Progress Note:     Date of Service: 12/6/2021  Primary Team: [facility] IM White Team   Attending: VICTORIANO Capellan M.D.   Senior Resident: Dr. Nuñez  Intern: Dr. Norman   Contact:  [phone]    Chief Complaint:   Palpitations w/ light headed    Subjective:  Blayne is a 56-year-old male presenting with palpitations with lightheadedness.  Patient with known history of SVT S/P cardiac ablation 12/3/2021, ENRICO.  Patient discharged on 12/3, next day patient checked heart rate with pulse ox and had readings in the 180s to 190s.  Patient tried Valsalva and metoprolol 15, no alleviation.  Next day patient continued elevated heart rate, walked outside with dog began feeling lightheaded pulse ox showed heart rate in the 150s.  Patient also began experiencing chest pain with soreness bilateral shoulders.  Patient presented to ED [facility] EKG showed SVT with elevated rate.  Patient given adenosine and converted to normal sinus, became hypotensive.  Patient transferred to [facility] being monitored on telemetry, has been sinus rhythm and asymptomatic since admission.  Patient denies dizziness, lightheadedness, shortness of breath, chest pain, orthopnea, or lower leg edema.  Patient has been avoiding alcohol, caffeine, energy drinks although pt states he drinks 6-8 beers a week.        Consultants/Specialty:  Cardiology    Review of Systems:    Review of Systems   Constitutional: Negative for chills and fever.   Eyes: Negative for blurred vision and double vision.   Respiratory: Negative for cough, hemoptysis, sputum production and shortness of breath.    Cardiovascular: Negative for chest pain, palpitations, orthopnea, leg swelling and PND.   Gastrointestinal: Negative for abdominal pain, constipation, diarrhea, heartburn and nausea.   Neurological: Negative for dizziness, tingling, tremors, seizures, weakness and headaches.       Objective Data:   Physical Exam:   Vitals:   Temp:  [36.4 °C (97.5 °F)-36.8 °C (98.2 °F)]  36.6 °C (97.9 °F)  Pulse:  [] 64  Resp:  [14-19] 18  BP: ()/(61-87) 119/78  SpO2:  [92 %-100 %] 96 %     Physical Exam  Constitutional:       General: He is not in acute distress.     Appearance: He is normal weight. He is not ill-appearing.   HENT:      Mouth/Throat:      Mouth: Mucous membranes are dry.      Pharynx: Oropharynx is clear.   Eyes:      Extraocular Movements: Extraocular movements intact.   Cardiovascular:      Rate and Rhythm: Normal rate and regular rhythm.      Pulses: Normal pulses.      Heart sounds: Normal heart sounds. No murmur heard.      Pulmonary:      Effort: Pulmonary effort is normal. No respiratory distress.      Breath sounds: Normal breath sounds. No wheezing or rales.   Abdominal:      General: Abdomen is flat. Bowel sounds are normal. There is no distension.      Palpations: Abdomen is soft. There is no mass.      Tenderness: There is no abdominal tenderness. There is no guarding or rebound.      Hernia: No hernia is present.   Musculoskeletal:      Right lower leg: No edema.      Left lower leg: No edema.   Skin:     Capillary Refill: Capillary refill takes less than 2 seconds.      Comments: Flushed face   Neurological:      General: No focal deficit present.      Mental Status: He is alert and oriented to person, place, and time.           Labs:   Lab Results   Component Value Date/Time    SODIUM 140 12/06/2021 01:28 AM    POTASSIUM 4.1 12/06/2021 01:28 AM    CHLORIDE 108 12/06/2021 01:28 AM    CO2 22 12/06/2021 01:28 AM    GLUCOSE 111 (H) 12/06/2021 01:28 AM    BUN 19 12/06/2021 01:28 AM    CREATININE 0.70 12/06/2021 01:28 AM      Lab Results   Component Value Date/Time    PROTHROMBTM 13.4 12/03/2021 08:20 AM    INR 1.05 12/03/2021 08:20 AM      Lab Results   Component Value Date/Time    WBC 8.6 12/06/2021 01:28 AM    RBC 4.15 (L) 12/06/2021 01:28 AM    HEMOGLOBIN 12.8 (L) 12/06/2021 01:28 AM    HEMATOCRIT 37.4 (L) 12/06/2021 01:28 AM    MCV 90.1 12/06/2021 01:28 AM     MCH 30.8 12/06/2021 01:28 AM    MCHC 34.2 12/06/2021 01:28 AM    MPV 9.6 12/06/2021 01:28 AM    NEUTSPOLYS 66.60 12/03/2021 08:20 AM    LYMPHOCYTES 24.80 12/03/2021 08:20 AM    MONOCYTES 7.60 12/03/2021 08:20 AM    EOSINOPHILS 0.60 12/03/2021 08:20 AM    BASOPHILS 0.20 12/03/2021 08:20 AM        Imaging:   EC-ECHOCARDIOGRAM COMPLETE W/O CONT   Final Result      Echo shows normal left ventricular systolic function with ejection fraction estimated be 60%.  Enlarged right atrium with dilated IVC and trace tricuspid regurgitation.    Problem Representation: .       * SVT (supraventricular tachycardia) (HCC)- (present on admission)  Assessment & Plan  Converted with adenosine and IVF, remains NS w/ HR in range.   TSH negative, Non concerning of ischemic changes w/ Troponin trended 54 w/ repeat 63.   Echo shows normal LV systolic function with EF of 60% and normal cardiac structure  Patient states SVT episodes triggered by exertion, encouraged patient to ambulate around the unit  Per telemetry patient remains in sinus rhythm with heart rate in the 60s.  Per cardiology, will discuss with EP for antiarrhythmics and plan for further ablation.  - Started flecainide 50 mg twice daily  - Continued Metroprolol, reduced dose of 75 mg twice daily  - As needed adenosine 6 mg for heart rate>155    Hypotension- (present on admission)  Assessment & Plan  Likely 2/2 arrythmia/tachycardia which patient claims reaches in the 190's  Maintain normal heart rate.  IVF      Arrhythmia- (present on admission)  Assessment & Plan  S/p Ablation 3 days prior  Found to be in SVT at outside facility and transferred to [facility] after he converted to NSR with IVF and 12 of adenosine.  Telemetry monitoring  Vagal maneurvers & Adenosine prn for svt  Continue home med Lopressor 100mg BID  Trend Troponin I

## 2021-12-06 NOTE — ASSESSMENT & PLAN NOTE
Likely 2/2 arrythmia/tachycardia which patient claims reaches in the 190's  Maintain normal heart rate.  IVF

## 2021-12-06 NOTE — ASSESSMENT & PLAN NOTE
Converted with adenosine and IVF, remains NS w/ HR in range.   TSH negative, Non concerning of ischemic changes w/ Troponin trended 54 w/ repeat 63.   Echo shows normal LV systolic function with EF of 60% and normal cardiac structure  Patient states SVT episodes triggered by exertion, encouraged patient to ambulate around the unit  Per telemetry patient remains in sinus rhythm with heart rate in the 60s.  Per cardiology, will discuss with EP for antiarrhythmics and plan for further ablation.  - Started flecainide 50 mg twice daily  - Continued Metroprolol, reduced dose of 75 mg twice daily  - As needed adenosine 6 mg for heart rate>155

## 2021-12-06 NOTE — CONSULTS
Cardiology Consult Note:    Jeff Carrera M.D.  Date & Time note created:    12/6/2021   7:36 AM     Referring MD:  Dr. Guerra    Patient ID:   Name:             Chung Griffiths   YOB: 1965  Age:                 56 y.o.  male   MRN:               [id number]                                                             Reason for Consult:      Recurrent SVT s/p ablation    History of Present Illness:    57yo M with recent onset Atrial Tachycardia earlier this year following Covid infection as well as history consistent with ENRICO, not confirmed at this time.  Was placed on metoprolol 100mg bid, had multiple recurrences leading to ablation with Dr. Piyush HUGGINS on 12/3.  Over the weekend repeatedly in and out of Afib, unable to perform any activity without significant tachycardia.  He experienced palpitations, but no other symptoms over the weekend until 12/5 pm when he developed lightheadedness associated with heart rate. He took an extra 50mg dose of home metoprolol without benefit.     Presented to the [facility] ED where he successfully converted after 2x  6mg adenosine. Afib then recurred and was associated with hypotension and lightheadedness; however, this was brief and he converted to NSR without intervention.  He has remained asymptomatic in NSR since that incident.  Pt was transferred to [facility] due to discomfort with recurrent symptomatic SVT.    Since arrival here, patient has remained asymptomatic in NSR rate 60-70s.  Has only been resting, he would like to ambulate to see what happens to his heart rate/rhythm.    Review of Systems:      Constitutional: Denies fevers, Denies weight changes  Eyes: Denies changes in vision, no eye pain  Ears/Nose/Throat/Mouth: Denies nasal congestion or sore throat   Cardiovascular: Reports resolution of palpitations since self-cardioversion in [place]. Denies chest pain   Respiratory: Denies shortness of breath , Denies cough  Gastrointestinal/Hepatic: Denies  abdominal pain, nausea, vomiting, diarrhea, constipation or GI bleeding   Genitourinary: Denies dysuria or frequency  Musculoskeletal/Rheum: Denies  joint pain and swelling, edema  Skin: Denies rash  Neurological: Denies headache, confusion, memory loss or focal weakness/parasthesias  Psychiatric: denies mood disorder   Endocrine: Carol thyroid problems  Heme/Oncology/Lymph Nodes: Denies enlarged lymph nodes, denies brusing or known bleeding disorder  All other systems were reviewed and are negative (AMA/CMS criteria)                Past Medical History:   Past Medical History:   Diagnosis Date   • Arrhythmia 11/17/2021    SVT   • Breath shortness 11/17/2021    SOB while sleeping   • Indigestion 11/17/2021    occ   • Pain 11/17/2021    L5-S1 disc   • Sleep apnea     Appointment on 11/23/21     Active Hospital Problems    Diagnosis    • Arrhythmia [I49.9]    • Hypotension [I95.9]    • SVT (supraventricular tachycardia) (HCC) [I47.1]        Past Surgical History:  Past Surgical History:   Procedure Laterality Date   • OTHER ORTHOPEDIC SURGERY      right knee       Hospital Medications:  Current Facility-Administered Medications   Medication Dose   • metoprolol tartrate (LOPRESSOR) tablet 100 mg  100 mg   • senna-docusate (PERICOLACE or SENOKOT S) 8.6-50 MG per tablet 2 Tablet  2 Tablet    And   • polyethylene glycol/lytes (MIRALAX) PACKET 1 Packet  1 Packet    And   • magnesium hydroxide (MILK OF MAGNESIA) suspension 30 mL  30 mL    And   • bisacodyl (DULCOLAX) suppository 10 mg  10 mg   • enalaprilat (Vasotec) injection 1.25 mg 1 mL  1.25 mg   • labetalol (NORMODYNE/TRANDATE) injection 10 mg  10 mg   • Metoprolol Tartrate (LOPRESSOR) injection 5 mg  5 mg   • aspirin EC (ECOTRIN) tablet 81 mg  81 mg   • ondansetron (ZOFRAN) syringe/vial injection 4 mg  4 mg   • ondansetron (ZOFRAN ODT) dispertab 4 mg  4 mg   • promethazine (PHENERGAN) tablet 12.5-25 mg  12.5-25 mg   • promethazine (PHENERGAN) suppository 12.5-25 mg   12.5-25 mg   • prochlorperazine (COMPAZINE) injection 5-10 mg  5-10 mg   • insulin regular (HumuLIN R,NovoLIN R) injection  1-6 Units    And   • dextrose 50% (D50W) injection 50 mL  50 mL   • adenosine (ADENOCARD) injection 6 mg  6 mg   • enoxaparin (LOVENOX) inj 40 mg  40 mg         Current Outpatient Medications:  Medications Prior to Admission   Medication Sig Dispense Refill Last Dose   • metoprolol tartrate (LOPRESSOR) 100 MG Tab Take 100 mg by mouth 2 times a day.   12/5/2021 at 1800   • therapeutic multivitamin-minerals (THERAGRAN-M) Tab Take 1 Tablet by mouth every day.   12/5/2021 at 0800       Medication Allergy:  No Known Allergies    Family History:  No family history on file.    Social History:  Social History     Socioeconomic History   • Marital status:      Spouse name: Not on file   • Number of children: Not on file   • Years of education: Not on file   • Highest education level: Not on file   Occupational History   • Not on file   Tobacco Use   • Smoking status: Never Smoker   • Smokeless tobacco: Never Used   Vaping Use   • Vaping Use: Never used   Substance and Sexual Activity   • Alcohol use: Yes     Comment: 6-8 per week   • Drug use: Never   • Sexual activity: Not on file   Other Topics Concern   • Not on file   Social History Narrative   • Not on file     Social Determinants of Health     Financial Resource Strain:    • Difficulty of Paying Living Expenses: Not on file   Food Insecurity:    • Worried About Running Out of Food in the Last Year: Not on file   • Ran Out of Food in the Last Year: Not on file   Transportation Needs:    • Lack of Transportation (Medical): Not on file   • Lack of Transportation (Non-Medical): Not on file   Physical Activity:    • Days of Exercise per Week: Not on file   • Minutes of Exercise per Session: Not on file   Stress:    • Feeling of Stress : Not on file   Social Connections:    • Frequency of Communication with Friends and Family: Not on file   •  "Frequency of Social Gatherings with Friends and Family: Not on file   • Attends Orthodox Services: Not on file   • Active Member of Clubs or Organizations: Not on file   • Attends Club or Organization Meetings: Not on file   • Marital Status: Not on file   Intimate Partner Violence:    • Fear of Current or Ex-Partner: Not on file   • Emotionally Abused: Not on file   • Physically Abused: Not on file   • Sexually Abused: Not on file   Housing Stability:    • Unable to Pay for Housing in the Last Year: Not on file   • Number of Places Lived in the Last Year: Not on file   • Unstable Housing in the Last Year: Not on file         Physical Exam:  Vitals/ General Appearance:   Weight/BMI: Body mass index is 31.38 kg/m².  /78   Pulse 64   Temp 36.6 °C (97.9 °F) (Temporal)   Resp 18   Ht 1.854 m (6' 1\")   Wt 108 kg (237 lb 14 oz)   SpO2 96%   Vitals:    12/05/21 2315 12/06/21 0459   BP: 119/87 119/78   Pulse: 67 64   Resp: 16 18   Temp: 36.8 °C (98.2 °F) 36.6 °C (97.9 °F)   TempSrc: Temporal Temporal   SpO2: 94% 96%   Weight: 108 kg (237 lb 14 oz)    Height: 1.854 m (6' 1\")      Oxygen Therapy:  Pulse Oximetry: 96 %, O2 (LPM): 0, O2 Delivery Device: None - Room Air    Constitutional:   Well developed, Well nourished, No acute distress  HENMT:  Normocephalic, Atraumatic, Oropharynx moist mucous membranes, No oral exudates, Nose normal.  No thyromegaly.  Eyes:  EOMI, Conjunctiva normal, No discharge.  Neck:  Normal range of motion, No cervical tenderness,  no JVD.  Cardiovascular:  Normal heart rate, Normal rhythm, No murmurs, No rubs, No gallops.   Extremitites with intact distal pulses, no cyanosis, or edema.  Lungs:  Normal breath sounds, breath sounds clear to auscultation bilaterally,  no crackles, no wheezing.   Abdomen: Bowel sounds normal, Soft, No tenderness, No guarding, No rebound, No masses, No hepatosplenomegaly.  Skin: Warm, Dry, No erythema, No rash, no induration.  Neurologic: Alert & oriented x " 3, No focal deficits noted, cranial nerves II through X are grossly intact.  Psychiatric: Affect normal, Judgment normal, Mood normal.      MDM (Data Review):     Records reviewed and summarized in current documentation    Lab Data Review:  Recent Results (from the past 24 hour(s))   CBC WITH DIFFERENTIAL    Collection Time: 12/05/21  7:45 PM   Result Value Ref Range    WBC 11.6 (H) 4.8 - 10.8 K/uL    RBC 4.23 (L) 4.70 - 6.10 M/uL    Hemoglobin 13.1 (L) 14.0 - 18.0 g/dL    Hematocrit 41.5 (L) 42.0 - 52.0 %    MCV 98.1 (H) 80.0 - 94.0 fL    MCH 31.0 27.0 - 31.0 pg    MCHC 31.6 (L) 33.0 - 37.0 g/dL    RDW 14.1 11.5 - 14.5 %    Platelet Count 208 130 - 400 K/uL    MPV 9.4 7.4 - 10.4 fL    Neutrophils Automated 53.2 39.0 - 70.0 %    Lymphocytes Automated 36.3 21.0 - 50.0 %    Monocytes Automated 8.7 2.0 - 9.0 %    Eosinophils Automated 1.2 0.0 - 5.0 %    Basophils Automated 0.3 0.0 - 3.0 %    Abs Neutrophils Automated 6.2 1.8 - 7.7 K/uL    Abs Lymph Automated 4.2 1.2 - 4.8 K/uL    Eosinophil Count, Blood 0.14 0.00 - 0.50 K/uL   COMP METABOLIC PANEL    Collection Time: 12/05/21  7:45 PM   Result Value Ref Range    Sodium 140 136 - 145 mmol/L    Potassium 4.7 3.5 - 5.1 mmol/L    Chloride 106 98 - 107 mmol/L    Co2 24 21 - 32 mmol/L    Anion Gap 15 10 - 18 mmol/L    Glucose 140 (H) 74 - 99 mg/dL    Bun 22 (H) 7 - 18 mg/dL    Creatinine 1.1 0.8 - 1.3 mg/dL    Calcium 8.7 8.5 - 11.0 mg/dL    AST(SGOT) 65 (H) 15 - 37 U/L    ALT(SGPT) 84 (H) 12 - 78 U/L    Alkaline Phosphatase 170 (H) 46 - 116 U/L    Total Bilirubin 0.5 0.2 - 1.0 mg/dL    Albumin 3.4 3.4 - 5.0 g/dL    Total Protein 6.9 6.4 - 8.2 g/dL    A-G Ratio 1.0    TROPONIN    Collection Time: 12/05/21  7:45 PM   Result Value Ref Range    Troponin I 78.1 (HH) 0.0 - 60.3 pg/mL   MAGNESIUM    Collection Time: 12/05/21  7:45 PM   Result Value Ref Range    Magnesium 2.3 1.8 - 2.4 mg/dL   ESTIMATED GFR    Collection Time: 12/05/21  7:45 PM   Result Value Ref Range    GFR If  African American >60 >60 mL/min/1.73 m 2    GFR If Non African American >60 >60 mL/min/1.73 m 2   SARS-CoV-2, PCR (In-House)    Collection Time: 12/05/21  8:50 PM   Result Value Ref Range    SARS-CoV-2 Source NP     SARS-CoV-2 by PCR Negative Negative   POCT glucose device results    Collection Time: 12/06/21  1:22 AM   Result Value Ref Range    Glucose - Accu-Ck 111 (H) 65 - 99 mg/dL   CBC without Differential    Collection Time: 12/06/21  1:28 AM   Result Value Ref Range    WBC 8.6 4.8 - 10.8 K/uL    RBC 4.15 (L) 4.70 - 6.10 M/uL    Hemoglobin 12.8 (L) 14.0 - 18.0 g/dL    Hematocrit 37.4 (L) 42.0 - 52.0 %    MCV 90.1 81.4 - 97.8 fL    MCH 30.8 27.0 - 33.0 pg    MCHC 34.2 33.7 - 35.3 g/dL    RDW 46.8 35.9 - 50.0 fL    Platelet Count 185 164 - 446 K/uL    MPV 9.6 9.0 - 12.9 fL   Comp Metabolic Panel (CMP)    Collection Time: 12/06/21  1:28 AM   Result Value Ref Range    Sodium 140 135 - 145 mmol/L    Potassium 4.1 3.6 - 5.5 mmol/L    Chloride 108 96 - 112 mmol/L    Co2 22 20 - 33 mmol/L    Anion Gap 10.0 7.0 - 16.0    Glucose 111 (H) 65 - 99 mg/dL    Bun 19 8 - 22 mg/dL    Creatinine 0.70 0.50 - 1.40 mg/dL    Calcium 8.9 8.5 - 10.5 mg/dL    AST(SGOT) 32 12 - 45 U/L    ALT(SGPT) 58 (H) 2 - 50 U/L    Alkaline Phosphatase 151 (H) 30 - 99 U/L    Total Bilirubin 0.5 0.1 - 1.5 mg/dL    Albumin 4.1 3.2 - 4.9 g/dL    Total Protein 6.3 6.0 - 8.2 g/dL    Globulin 2.2 1.9 - 3.5 g/dL    A-G Ratio 1.9 g/dL   proBrain Natriuretic Peptide, NT    Collection Time: 12/06/21  1:28 AM   Result Value Ref Range    NT-proBNP 1961 (H) 0 - 125 pg/mL   TSH    Collection Time: 12/06/21  1:28 AM   Result Value Ref Range    TSH 4.640 0.380 - 5.330 uIU/mL   Free Thyroxine (T4)    Collection Time: 12/06/21  1:28 AM   Result Value Ref Range    Free T-4 1.19 0.93 - 1.70 ng/dL   Troponin    Collection Time: 12/06/21  1:28 AM   Result Value Ref Range    Troponin T 54 (H) 6 - 19 ng/L   HEMOGLOBIN A1C    Collection Time: 12/06/21  1:28 AM   Result  Value Ref Range    Glycohemoglobin 5.2 4.0 - 5.6 %    Est Avg Glucose 103 mg/dL   ESTIMATED GFR    Collection Time: 21  1:28 AM   Result Value Ref Range    GFR If African American >60 >60 mL/min/1.73 m 2    GFR If Non African American >60 >60 mL/min/1.73 m 2   POCT glucose device results    Collection Time: 21  5:38 AM   Result Value Ref Range    Glucose - Accu-Ck 97 65 - 99 mg/dL       Imaging/Procedures Review:    Chest Xray:  Reviewed    EK/5 19:05:  SVT with rate >150  Normal axis  Unable to evaluate IA interval, remaining intervals normal  No obvious ST changes      19:30:  NSR with rate ~60  Normal Axis  Normal Intervals  No ST changes    ECHO:  No prior echo        MDM (Assessment and Plan):     Active Hospital Problems    Diagnosis    • Arrhythmia [I49.9]    • Hypotension [I95.9]    • SVT (supraventricular tachycardia) (HCC) [I47.1]        Paroxysmal Atrial Tachycardia  Recent onset this year  S/p ablation on 12/3, noninducible intraop after ablation  Recurrent pSVT over the weekend, presented to [facility] ED - cardioversion with adenosine 12mg, recurred, spontaneously converted to NSR.  Stable in NSR since then    Recommendations:  -We will discuss with EP, per Dr. Pickett's op note 12/3 recommendation to initiate Flecainide for recurrence of SVT and followup outpatient, anticipate this will be his recommendation - will follow up with primary team for official recommendation after discussion with EP team.      Thank you for the consultation!    Discussed case with attending Cardiologist Dr. Adriano Carrera M.D.

## 2021-12-06 NOTE — TELEPHONE ENCOUNTER
Tried to call patient multiple times. His phone rang once and went to a busy signal. I sent a my chart message to this patient requesting he call our office to schedule this appointment.

## 2021-12-06 NOTE — TELEPHONE ENCOUNTER
KYLE Rizvi, Med Ass't  Patient is status post SVT ablation 12/3/2021, would you please schedule a follow-up appointment with Marion Triana  or  Isamar pelayo in 4 weeks thank you so much

## 2021-12-06 NOTE — H&P
Hospital Medicine History & Physical Note    Date of Service  12/6/2021    Primary Care Physician  James Leone P.A.-C.    Consultants  cardiology    Specialist Names: Consult surgery in AM:   Code Status  Full Code    Chief Complaint  No chief complaint on file.      History of Presenting Illness  56M with recent cardiac ablation by Dr. Posey presented to the transferring facility with palpitations tachycardia and lightheadedness x3 days was transferred to [facility] for symptomatic SVT (hypotension, pale). Patient was converted with adenosine 12 briefly before resuming rates as high as 190+ patient reports. These episodes are waxing and waning since he had cardiac ablation. He reports he's been taking his metoprolol 100mg BID and even tried taking an extra 50mg dose to slow his heart rate without success.     I discussed the plan of care with patient.    Review of Systems  ROS  12+ systems reviewed and negative except as noted in HPI.    Past Medical History   has a past medical history of Arrhythmia (11/17/2021), Breath shortness (11/17/2021), Indigestion (11/17/2021), Pain (11/17/2021), and Sleep apnea.      Surgical History   has a past surgical history that includes other orthopedic surgery.       Family History  Family history reviewed with patient. There is no family history that is pertinent to the chief complaint.     Social History   reports that he has never smoked. He has never used smokeless tobacco. He reports current alcohol use. He reports that he does not use drugs.    Allergies  No Known Allergies    Medications  Prior to Admission Medications   Prescriptions Last Dose Informant Patient Reported? Taking?   metoprolol tartrate (LOPRESSOR) 100 MG Tab 12/5/2021 at 1800  Yes No   Sig: Take 100 mg by mouth 2 times a day.   therapeutic multivitamin-minerals (THERAGRAN-M) Tab 12/5/2021 at 0800  Yes No   Sig: Take 1 Tablet by mouth every day.      Facility-Administered Medications: None       Physical  Exam  Temp:  [36.4 °C (97.5 °F)-36.8 °C (98.2 °F)] 36.8 °C (98.2 °F)  Pulse:  [] 67  Resp:  [14-19] 16  BP: ()/(61-87) 119/87  SpO2:  [92 %-100 %] 94 %  Blood Pressure: 119/87   Temperature: 36.8 °C (98.2 °F)   Pulse: 67   Respiration: 16   Pulse Oximetry: 94 %       Physical Exam  Constitutional:       Appearance: Normal appearance.   HENT:      Head: Normocephalic and atraumatic.      Right Ear: External ear normal.      Left Ear: External ear normal.      Nose: Nose normal.      Mouth/Throat:      Mouth: Mucous membranes are moist.      Pharynx: No oropharyngeal exudate or posterior oropharyngeal erythema.   Eyes:      Extraocular Movements: Extraocular movements intact.      Pupils: Pupils are equal, round, and reactive to light.   Cardiovascular:      Rate and Rhythm: Regular rhythm. Tachycardia present.      Pulses: Normal pulses.      Heart sounds: No murmur heard.  No friction rub.   Pulmonary:      Effort: Pulmonary effort is normal. No respiratory distress.      Breath sounds: Normal breath sounds. No stridor. No wheezing or rhonchi.   Abdominal:      General: Abdomen is flat. There is no distension.      Palpations: Abdomen is soft. There is no mass.      Tenderness: There is no abdominal tenderness.   Musculoskeletal:         General: Normal range of motion.      Cervical back: Normal range of motion and neck supple.   Skin:     General: Skin is warm and dry.      Capillary Refill: Capillary refill takes less than 2 seconds.      Coloration: Skin is pale. Skin is not jaundiced.   Neurological:      Mental Status: He is alert and oriented to person, place, and time.      Coordination: Coordination normal.      Gait: Gait normal.   Psychiatric:         Mood and Affect: Mood normal.         Behavior: Behavior normal.         Laboratory:  Recent Labs     12/03/21  0820 12/05/21  1945 12/06/21  0128   WBC 10.1 11.6* 8.6   RBC 4.75 4.23* 4.15*   HEMOGLOBIN 14.5 13.1* 12.8*   HEMATOCRIT 43.2 41.5*  37.4*   MCV 90.9 98.1* 90.1   MCH 30.5 31.0 30.8   MCHC 33.6* 31.6* 34.2   RDW 46.9 14.1 46.8   PLATELETCT 203 208 185   MPV 9.5 9.4 9.6     Recent Labs     12/03/21  0820 12/05/21 1945 12/06/21  0128   SODIUM 142 140 140   POTASSIUM 4.2 4.7 4.1   CHLORIDE 105 106 108   CO2 27 24 22   GLUCOSE 115* 140* 111*   BUN 21 22* 19   CREATININE 0.87 1.1 0.70   CALCIUM 9.5 8.7 8.9     Recent Labs     12/03/21  0820 12/05/21 1945 12/06/21  0128   ALTSGPT 17 84* 58*   ASTSGOT 10* 65* 32   ALKPHOSPHAT 86 170* 151*   TBILIRUBIN 0.9 0.5 0.5   GLUCOSE 115* 140* 111*     Recent Labs     12/03/21  0820   INR 1.05     Recent Labs     12/06/21  0128   NTPROBNP 1961*         Recent Labs     12/06/21  0128   TROPONINT 54*       Imaging:  EC-ECHOCARDIOGRAM COMPLETE W/O CONT    (Results Pending)       X-Ray:  I have personally reviewed the images and compared with prior images. and My impression is: I agree with the radiologists' assessment  EKG:  I have personally reviewed the images and compared with prior images. SVT -180, no ischemic changes, qtc or pr prolongation noted.    I anticipate this patient is appropriate for observation status at this time.    * SVT (supraventricular tachycardia) (HCC)- (present on admission)  Assessment & Plan  Converted with adenosine and ivf  Cardiac moniter  Vagal maneuvers & adenosine prn      Arrhythmia- (present on admission)  Assessment & Plan  S/p Ablation 3 days prior  Found to be in SVT at outside facility and transferred to [facility] after he converted to NSR with IVF and 12 of adenosine.  Telemetry monitoring  Vagal maneurvers & Adenosine prn for svt  Continue home med Lopressor 100mg BID  Trend Troponin I    Hypotension- (present on admission)  Assessment & Plan  Likely 2/2 arrythmia/tachycardia which patient claims reaches in the 190's  Maintain normal heart rate.  IVF        VTE prophylaxis: SCDs/TEDs and heparin ppx

## 2021-12-06 NOTE — CARE PLAN
Problem: Knowledge Deficit - Standard  Goal: Patient and family/care givers will demonstrate understanding of plan of care, disease process/condition, diagnostic tests and medications  Outcome: Progressing   The patient is Stable - Low risk of patient condition declining or worsening         Progress made toward(s) clinical / shift goals:  maintain heart rate    Patient is not progressing towards the following goals:

## 2021-12-06 NOTE — TELEPHONE ENCOUNTER
Pt and wife called, was taken off of metoprolol after SVT ablation. HR jumping up to 190 with activity, 90 with rest.  Patient very symptomatic, feels poorly.    They have been taking extra doses of metoprolol and have sent messages in my chart.    [place] [facility] [place]    I agree with the emergency room visit, they should be able to get him converted with adenosine, IV metoprolol or IV Cardizem.    I will send a [employer] message to Dr. Pickett and his team asking that they reach out tomorrow on Monday with further instructions.    Vera, please forward to the EP team.  Thank you.

## 2021-12-06 NOTE — THERAPY
Physical Therapy Contact Note    Patient Name: Chung Griffiths  Age:  56 y.o., Sex:  male  Medical Record #: [id number]  Today's Date: 12/6/2021    Pt is a 56 year old male admitted with recurrent SVT s/p ablation. PMH includes COVID. Pt seen walking laps around unit with wife prior to conversation (pt and wife reporting HR remained stable during gait 70s). Education provided to pt and wife regarding acute care therapy and walking program for return to activity. Pt and wife expressed no concerns for dc as long as HR remains stable. PT order completed as no evaluation indicated.    Nidhi Naidu PT, DPT

## 2021-12-07 VITALS
SYSTOLIC BLOOD PRESSURE: 114 MMHG | HEIGHT: 73 IN | HEART RATE: 62 BPM | OXYGEN SATURATION: 92 % | TEMPERATURE: 97.2 F | RESPIRATION RATE: 17 BRPM | BODY MASS INDEX: 31.53 KG/M2 | WEIGHT: 237.88 LBS | DIASTOLIC BLOOD PRESSURE: 83 MMHG

## 2021-12-07 LAB
ALBUMIN SERPL BCP-MCNC: 3.9 G/DL (ref 3.2–4.9)
ALBUMIN/GLOB SERPL: 2.1 G/DL
ALP SERPL-CCNC: 118 U/L (ref 30–99)
ALT SERPL-CCNC: 50 U/L (ref 2–50)
ANION GAP SERPL CALC-SCNC: 12 MMOL/L (ref 7–16)
AST SERPL-CCNC: 21 U/L (ref 12–45)
BILIRUB SERPL-MCNC: 0.5 MG/DL (ref 0.1–1.5)
BUN SERPL-MCNC: 21 MG/DL (ref 8–22)
CALCIUM SERPL-MCNC: 9.1 MG/DL (ref 8.5–10.5)
CHLORIDE SERPL-SCNC: 105 MMOL/L (ref 96–112)
CHOLEST SERPL-MCNC: 197 MG/DL (ref 100–199)
CO2 SERPL-SCNC: 23 MMOL/L (ref 20–33)
CREAT SERPL-MCNC: 0.77 MG/DL (ref 0.5–1.4)
EKG IMPRESSION: NORMAL
GGT SERPL-CCNC: 130 U/L (ref 7–51)
GLOBULIN SER CALC-MCNC: 1.9 G/DL (ref 1.9–3.5)
GLUCOSE BLD-MCNC: 103 MG/DL (ref 65–99)
GLUCOSE SERPL-MCNC: 96 MG/DL (ref 65–99)
HDLC SERPL-MCNC: 35 MG/DL
LDLC SERPL CALC-MCNC: 111 MG/DL
POTASSIUM SERPL-SCNC: 4 MMOL/L (ref 3.6–5.5)
PROT SERPL-MCNC: 5.8 G/DL (ref 6–8.2)
SODIUM SERPL-SCNC: 140 MMOL/L (ref 135–145)
TRIGL SERPL-MCNC: 253 MG/DL (ref 0–149)

## 2021-12-07 PROCEDURE — A9270 NON-COVERED ITEM OR SERVICE: HCPCS | Performed by: STUDENT IN AN ORGANIZED HEALTH CARE EDUCATION/TRAINING PROGRAM

## 2021-12-07 PROCEDURE — 93005 ELECTROCARDIOGRAM TRACING: CPT | Performed by: STUDENT IN AN ORGANIZED HEALTH CARE EDUCATION/TRAINING PROGRAM

## 2021-12-07 PROCEDURE — 700102 HCHG RX REV CODE 250 W/ 637 OVERRIDE(OP): Performed by: PHYSICIAN ASSISTANT

## 2021-12-07 PROCEDURE — 80061 LIPID PANEL: CPT

## 2021-12-07 PROCEDURE — 99238 HOSP IP/OBS DSCHRG MGMT 30/<: CPT | Mod: GC | Performed by: HOSPITALIST

## 2021-12-07 PROCEDURE — 36415 COLL VENOUS BLD VENIPUNCTURE: CPT

## 2021-12-07 PROCEDURE — 700102 HCHG RX REV CODE 250 W/ 637 OVERRIDE(OP): Performed by: STUDENT IN AN ORGANIZED HEALTH CARE EDUCATION/TRAINING PROGRAM

## 2021-12-07 PROCEDURE — 80053 COMPREHEN METABOLIC PANEL: CPT

## 2021-12-07 PROCEDURE — 99232 SBSQ HOSP IP/OBS MODERATE 35: CPT | Performed by: STUDENT IN AN ORGANIZED HEALTH CARE EDUCATION/TRAINING PROGRAM

## 2021-12-07 PROCEDURE — A9270 NON-COVERED ITEM OR SERVICE: HCPCS | Performed by: PHYSICIAN ASSISTANT

## 2021-12-07 PROCEDURE — 82977 ASSAY OF GGT: CPT

## 2021-12-07 PROCEDURE — 93010 ELECTROCARDIOGRAM REPORT: CPT | Performed by: INTERNAL MEDICINE

## 2021-12-07 PROCEDURE — 82962 GLUCOSE BLOOD TEST: CPT

## 2021-12-07 RX ORDER — M-VIT,TX,IRON,MINS/CALC/FOLIC 27MG-0.4MG
1 TABLET ORAL DAILY
Qty: 30 TABLET | Refills: 0 | Status: SHIPPED | OUTPATIENT
Start: 2021-12-07 | End: 2022-01-06

## 2021-12-07 RX ORDER — FLECAINIDE ACETATE 50 MG/1
50 TABLET ORAL 2 TIMES DAILY
Qty: 60 TABLET | Refills: 0 | Status: SHIPPED | OUTPATIENT
Start: 2021-12-07 | End: 2022-01-03 | Stop reason: SDUPTHER

## 2021-12-07 RX ORDER — METOPROLOL SUCCINATE 50 MG/1
100 TABLET, EXTENDED RELEASE ORAL EVERY EVENING
Status: DISCONTINUED | OUTPATIENT
Start: 2021-12-07 | End: 2021-12-07 | Stop reason: HOSPADM

## 2021-12-07 RX ORDER — METOPROLOL SUCCINATE 100 MG/1
100 TABLET, EXTENDED RELEASE ORAL EVERY EVENING
Qty: 30 TABLET | Refills: 0 | Status: SHIPPED | OUTPATIENT
Start: 2021-12-07 | End: 2022-01-03 | Stop reason: SDUPTHER

## 2021-12-07 RX ADMIN — FLECAINIDE ACETATE 50 MG: 100 TABLET ORAL at 06:22

## 2021-12-07 RX ADMIN — ASPIRIN 81 MG: 81 TABLET, COATED ORAL at 06:21

## 2021-12-07 RX ADMIN — METOPROLOL TARTRATE 75 MG: 50 TABLET, FILM COATED ORAL at 06:22

## 2021-12-07 ASSESSMENT — ENCOUNTER SYMPTOMS
NAUSEA: 0
HEADACHES: 0
CHEST TIGHTNESS: 0
BRUISES/BLEEDS EASILY: 0
PALPITATIONS: 0
HEARTBURN: 0
MYALGIAS: 0
BLOOD IN STOOL: 0
SHORTNESS OF BREATH: 0
TINGLING: 0
WHEEZING: 0
SORE THROAT: 0
COUGH: 0
DIZZINESS: 0
CHILLS: 0
WEAKNESS: 0
DOUBLE VISION: 0
ABDOMINAL PAIN: 0
BLURRED VISION: 0
FEVER: 0
SINUS PAIN: 0
SENSORY CHANGE: 0
VOMITING: 0

## 2021-12-07 NOTE — DISCHARGE INSTRUCTIONS
Discharge Instructions    Discharged to home by car with relative. Discharged via wheelchair, hospital escort: Yes.  Special equipment needed: Not Applicable    Be sure to schedule a follow-up appointment with your primary care doctor or any specialists as instructed.     Discharge Plan:   Diet Plan: Discussed  Activity Level: Discussed  Confirmed Follow up Appointment: Appointment Scheduled  Confirmed Symptoms Management: Discussed  Medication Reconciliation Updated: Yes    I understand that a diet low in cholesterol, fat, and sodium is recommended for good health. Unless I have been given specific instructions below for another diet, I accept this instruction as my diet prescription.       Special Instructions: None    · Is patient discharged on Warfarin / Coumadin?   No     Depression / Suicide Risk    As you are discharged from this [facility] facility, it is important to learn how to keep safe from harming yourself.    Recognize the warning signs:  · Abrupt changes in personality, positive or negative- including increase in energy   · Giving away possessions  · Change in eating patterns- significant weight changes-  positive or negative  · Change in sleeping patterns- unable to sleep or sleeping all the time   · Unwillingness or inability to communicate  · Depression  · Unusual sadness, discouragement and loneliness  · Talk of wanting to die  · Neglect of personal appearance   · Rebelliousness- reckless behavior  · Withdrawal from people/activities they love  · Confusion- inability to concentrate     If you or a loved one observes any of these behaviors or has concerns about self-harm, here's what you can do:  · Talk about it- your feelings and reasons for harming yourself  · Remove any means that you might use to hurt yourself (examples: pills, rope, extension cords, firearm)  · Get professional help from the community (Mental Health, Substance Abuse, psychological counseling)  · Do not be alone:Call your  Safe Contact- someone whom you trust who will be there for you.  · Call your local CRISIS HOTLINE 842-5411 or 969-396-0278  · Call your local Children's Mobile Crisis Response Team [place] (131) 472-3769 or www.SocialSamba  · Call the toll free National Suicide Prevention Hotlines   · National Suicide Prevention Lifeline 410-496-SJCQ (6363)  · National 1CLICK Line Network 800-SUICIDE (046-6842)

## 2021-12-07 NOTE — PROGRESS NOTES
Cardiology Progress Note    Name:   Chung Griffiths   YOB: 1965  Age:   56 y.o.  male   MRN:   [id number]    Consulting Cardiologist: Shauna Oh MD      Chief Complaint: Tachycardia/Palpitatione      History of Present Illness:  55yo M with recurrent SVT s/p ablation 12/3 for atrial tachycardia.  Transferred 12/6 from [facility] for concern regarding recurrence of SVT that converted with 12mg adenosine with subsequent episode of unstable SVT that self converted prior to transfer.  Has been NSR and stable since arrival here.    Interval Events:  Echo yesterday demonstrating preserved function and no evidence of clots. Feeling well overall, no concerns.      Review of Systems   Constitutional: Negative for chills and fever.   HENT: Negative for congestion, hearing loss, sinus pain and sore throat.    Eyes: Negative for blurred vision and double vision.   Respiratory: Negative for cough, shortness of breath and wheezing.    Cardiovascular: Negative for chest pain, palpitations and leg swelling.   Gastrointestinal: Negative for abdominal pain, heartburn, nausea and vomiting.   Genitourinary: Negative for dysuria, frequency and urgency.   Musculoskeletal: Negative for myalgias.   Skin: Negative for rash.   Neurological: Negative for dizziness, tingling, sensory change, weakness and headaches.   Endo/Heme/Allergies: Does not bruise/bleed easily.         Past Surgical History:   Procedure Laterality Date   • OTHER ORTHOPEDIC SURGERY      right knee       No family history on file.    Social History     Tobacco Use   Smoking Status Never Smoker   Smokeless Tobacco Never Used       No Known Allergies      Medications   No current facility-administered medications on file prior to encounter.     Current Outpatient Medications on File Prior to Encounter   Medication Sig Dispense Refill   • metoprolol tartrate (LOPRESSOR) 100 MG Tab Take 100 mg by mouth 2 times a day.     • therapeutic  multivitamin-minerals (THERAGRAN-M) Tab Take 1 Tablet by mouth every day.             Physical Exam  Vitals:    12/07/21 0723   BP: 128/81   Pulse: 60   Resp: 16   Temp: 36.2 °C (97.2 °F)   SpO2: 91%     Physical Exam  Constitutional:       General: He is not in acute distress.     Appearance: Normal appearance. He is not ill-appearing.   HENT:      Head: Normocephalic and atraumatic.      Nose: Nose normal.      Mouth/Throat:      Mouth: Mucous membranes are moist.      Pharynx: Oropharynx is clear.   Eyes:      Extraocular Movements: Extraocular movements intact.      Conjunctiva/sclera: Conjunctivae normal.   Cardiovascular:      Rate and Rhythm: Normal rate and regular rhythm.      Pulses: Normal pulses.      Heart sounds: Normal heart sounds. No murmur heard.      Pulmonary:      Effort: Pulmonary effort is normal. No respiratory distress.      Breath sounds: Normal breath sounds. No wheezing, rhonchi or rales.   Abdominal:      General: Abdomen is flat. There is no distension.   Musculoskeletal:         General: No swelling, deformity or signs of injury. Normal range of motion.      Cervical back: Normal range of motion and neck supple.   Lymphadenopathy:      Cervical: No cervical adenopathy.   Skin:     General: Skin is warm and dry.      Findings: No rash.   Neurological:      General: No focal deficit present.      Mental Status: He is alert and oriented to person, place, and time.           Labs (personally reviewed):     Lab Results   Component Value Date/Time    SODIUM 140 12/07/2021 01:20 AM    POTASSIUM 4.0 12/07/2021 01:20 AM    CHLORIDE 105 12/07/2021 01:20 AM    CO2 23 12/07/2021 01:20 AM    GLUCOSE 96 12/07/2021 01:20 AM    BUN 21 12/07/2021 01:20 AM    CREATININE 0.77 12/07/2021 01:20 AM     Lab Results   Component Value Date/Time    ALKPHOSPHAT 118 (H) 12/07/2021 01:20 AM    ASTSGOT 21 12/07/2021 01:20 AM    ALTSGPT 50 12/07/2021 01:20 AM    TBILIRUBIN 0.5 12/07/2021 01:20 AM      Lab Results    Component Value Date/Time    CHOLSTRLTOT 197 12/07/2021 01:20 AM     (H) 12/07/2021 01:20 AM    HDL 35 (A) 12/07/2021 01:20 AM    TRIGLYCERIDE 253 (H) 12/07/2021 01:20 AM     No results found for: BNPBTYPENAT      Cardiac Imaging and Procedures Review:        Personal EKG Interpretation:  12/7 am:  Sinus rhythm, rate ~60  Left axis deviation  Left anterior fascicular block  Normal intervals otherwise  No ST segment changes, T wave inversions in anterior/lateral leads slightly more pronounced than prior    Echo:  12/6:  Normal LV size and systolic function, EF 60%  Normal RV size and systolic function  Valves normal    Stress Test n/a:    [facility] n/a:      Assessment and Medical Decision Making:    Paroxysmal Atrial Tachycardia  Recent onset this year  S/p ablation on 12/3, noninducible intraop after ablation  Recurrent pSVT over the weekend, presented to [facility] ED - cardioversion with adenosine 12mg, recurred, spontaneously converted to NSR.  Stable in NSR since then  Echo demonstrating good cardiac function, EF 60%    Recommendations:  -Flecainide 50mg bid  -Continue Toprol SL 100mg every evening  -Followup with Cardiology and EP outpatient      Thank you for the consultation, Cardiology will sign off.  Please call with any questions regarding plan of care.      Discussed with attending physician Dr. Oh.    Jeff Carrera M.D.

## 2021-12-07 NOTE — THERAPY
Missed Therapy     Patient Name: Chung Griffiths  Age:  56 y.o., Sex:  male  Medical Record #: [id number]  Today's Date: 12/7/2021    OT consult rec'd. Per RN and pt, he is up self in room, completing all ADLs/txfs w/o assist. Seen ambulating independently in hallway. OT eval not warranted/order completed. Please re-consult OT if pt has change in function.

## 2021-12-07 NOTE — DISCHARGE SUMMARY
Discharge Summary    Date of Admission: 12/5/2021  Date of Discharge: 12/7/21  Discharging Attending: VICTORIANO Capellan M.D.   Discharging Senior Resident: Dr. Weiner  Discharging Intern: Dr. Norman     CHIEF COMPLAINT ON ADMISSION  No chief complaint on file.      Reason for Admission  Symptomatic recurring SVT    Admission Date  12/5/2021    CODE STATUS  Full Code    HPI & HOSPITAL COURSE  56-year-old male with past medical history of symptomatic PSVT status post cardiac ablation on 12/3/2021 who presented with symptomatic PSVT with rate in the 170s.  He converted to normal sinus rhythm with adenosine at an outside hospital.  He was evaluated by cardiology and placed on flecainide and metoprolol SR. He was observed in the hospital on telemetry and did not have any recurrent episodes of PSVT. He has ambulated in the hospital and did not have reoccurrence of arhythmia with exertion. On initial presentation, he symptoms were related to exertion and included chest pain, shortness of breath, palpitations, and lightheadedness.  Echo performed on 12/6/2021 showed left ventricular ejection fraction to be 60%.  Right ventricular systolic pressure 30 mmHg.  He has been scheduled to follow-up with Dr. Pickett, electrophysiologist, outpatient on January 10, 2022.  Will also be scheduled to follow-up with general cardiology in 1 week post discharge to go over his medications and make sure that he is tolerating the flecainide and metoprolol as well as to make sure he is not having reoccurring arrhythmias.  Of note, patient was planning on having a sleep study outpatient prior to hospitalization.  It appears that his insurance will not cover the sleep study.  PCP please look at alternative ways to get patient approved for an outpatient sleep study as this could play a contributing role to developing arrhythmias.     Therefore, he is discharged in fair and stable condition to home with close outpatient follow-up.    The patient  met 2-midnight criteria for an inpatient stay at the time of discharge.    PHYSICAL EXAM ON DISCHARGE  Temp:  [36.1 °C (97 °F)-36.3 °C (97.4 °F)] 36.2 °C (97.2 °F)  Pulse:  [60-70] 62  Resp:  [16-18] 17  BP: (100-128)/(59-86) 114/83  SpO2:  [91 %-93 %] 92 %    Physical Exam  Constitutional:       General: He is not in acute distress.     Appearance: Normal appearance. He is obese. He is not ill-appearing, toxic-appearing or diaphoretic.   HENT:      Head: Normocephalic and atraumatic.      Nose: Nose normal.      Mouth/Throat:      Mouth: Mucous membranes are moist.   Eyes:      General: No scleral icterus.     Extraocular Movements: Extraocular movements intact.      Conjunctiva/sclera: Conjunctivae normal.   Cardiovascular:      Rate and Rhythm: Normal rate and regular rhythm.      Pulses: Normal pulses.      Heart sounds: Normal heart sounds. No murmur heard.  No friction rub. No gallop.    Pulmonary:      Effort: Pulmonary effort is normal. No respiratory distress.      Breath sounds: Normal breath sounds. No wheezing, rhonchi or rales.   Abdominal:      General: Abdomen is flat. Bowel sounds are normal. There is no distension.      Palpations: Abdomen is soft.      Tenderness: There is no abdominal tenderness. There is no guarding or rebound.   Musculoskeletal:         General: Normal range of motion.      Cervical back: Normal range of motion and neck supple.      Right lower leg: No edema.      Left lower leg: No edema.   Skin:     General: Skin is warm.      Findings: No lesion or rash.   Neurological:      General: No focal deficit present.      Mental Status: He is alert and oriented to person, place, and time.   Psychiatric:         Mood and Affect: Mood normal.         Behavior: Behavior normal.         Thought Content: Thought content normal.         Judgment: Judgment normal.         Discharge Date  12/7/21    FOLLOW UP ITEMS POST DISCHARGE  PCP  Cardiology   Electrophysiologist     DISCHARGE  DIAGNOSES  Principal Problem:    SVT (supraventricular tachycardia) (HCC) POA: Yes  Active Problems:    Arrhythmia POA: Yes    Hypotension POA: No  Resolved Problems:    * No resolved hospital problems. *      FOLLOW UP  Future Appointments   Date Time Provider Department Center   1/10/2022  7:40 AM Michael Pickett M.D. [facility] None     James Leone P.A.-C.  [address] [address]  [phone]    In 1 week      Elaine Law M.D.  [address]  [address]  [place] [address]  [phone]    In 2 days      Michael Pickett M.D.  [address]  [address]  [facility] [address]  [phone]    In 1 month        MEDICATIONS ON DISCHARGE     Medication List      START taking these medications      Instructions   flecainide 50 MG tablet  Commonly known as: TAMBOCOR   Take 1 Tablet by mouth 2 times a day for 30 days.  Dose: 50 mg     metoprolol  MG Tb24  Commonly known as: TOPROL XL   Take 1 Tablet by mouth every evening for 30 days.  Dose: 100 mg        CONTINUE taking these medications      Instructions   therapeutic multivitamin-minerals Tabs   Take 1 Tablet by mouth every day for 30 days.  Dose: 1 Tablet        STOP taking these medications    metoprolol tartrate 100 MG Tabs  Commonly known as: LOPRESSOR            Allergies  No Known Allergies    DIET  Orders Placed This Encounter   Procedures   • Diet Order Diet: Regular     Standing Status:   Standing     Number of Occurrences:   1     Order Specific Question:   Diet:     Answer:   Regular [1]       ACTIVITY  As tolerated.  Weight bearing as tolerated    CONSULTATIONS  Dr. Shauna Oh with Cardiology consulted.  Treatment options were discussed and plan of care agreed upon.    PROCEDURES  N/A    Time spent on patient care and discharge planning 30 minutes

## 2021-12-07 NOTE — PROGRESS NOTES
Assumed care of patient at bedside report from DAY RN. Updated on POC. Patient currently A & O x 4; on 0 L O2; up self; without complaints of acute pain. Call light within reach. Whiteboard updated. Fall precautions in place. Bed locked and in lowest position. All questions answered. No other needs indicated at this time.

## 2021-12-07 NOTE — CARE PLAN
The patient is Stable - Low risk of patient condition declining or worsening    Shift Goals  Clinical Goals: Monitor HR, rest    Progress made toward(s) clinical / shift goals:  HR maintained in NSR during shift. Pt rested throughout shift.     Patient is not progressing towards the following goals:N/A    Problem: Knowledge Deficit - Standard  Goal: Patient and family/care givers will demonstrate understanding of plan of care, disease process/condition, diagnostic tests and medications  Outcome: Progressing

## 2021-12-07 NOTE — CONSULTS
"Cardiology Initial Consultation    Date of Service  12/7/2021    Referring Physician  VICTORIANO Capellan M.D.    Reason for Consultation  pSVT    History of Presenting Illness  Chung Griffiths is a very pleasant 56 y.o. male transferred from [facility] on 12/6/2021 with symptomatic SVT.      Today patient states that he woke up in the early am the next day after his ablation with elevated heart rate, he took 50 mg of lopressor and took it easy throughout most of the weekend. Patient states that the only time his HR would be in the 90's was when he was sitting down. On Sunday evening patient decided to try and take a light walk around his back yard and throw the stick for his dog. Patient states while throwing the stick foe his dog he felt a sensation of lightheadedness and rapid heart rate, he had to walk over to his deck and sit down. He took 100 mg of lopressor and sat down in his house waiting for it to \"kick in\". At 9 pm patient was still not feeling well and decided to drive to [facility]. Patient was converted with the use of adenosine at [facility].    TTE yesterday showed preserved LV function with no significant valvular abnormality.     Patient was started on Flecainide 50 mg BID by general cardiology last night and continued on Lopressor 75 mg BID. Reports feeling well this am.     Past medical history is significant for AT S/P ablation with Dr. Michael Pickett on 12/3/2021 and ?ENRICO (sleep study planned).     Patient denies any significant family history.     Socially patient drinks 6-8 drinks per week. Denies tobacco or recreational drug use.     Review of Systems  Review of Systems   Constitutional: Negative for fever.   Respiratory: Negative for chest tightness and shortness of breath.    Cardiovascular: Negative for chest pain, palpitations and leg swelling.   Gastrointestinal: Negative for abdominal pain and blood in stool.   Genitourinary: Negative for hematuria. "   Musculoskeletal: Negative for gait problem.   Neurological: Negative for dizziness and syncope.   All other systems reviewed and are negative.      Past Medical History   has a past medical history of Arrhythmia (11/17/2021), Breath shortness (11/17/2021), Indigestion (11/17/2021), Pain (11/17/2021), and Sleep apnea. He also has no past medical history of Malignant hyperthermia.    Surgical History   has a past surgical history that includes other orthopedic surgery.    Family History  family history is not on file.    Social History   reports that he has never smoked. He has never used smokeless tobacco. He reports current alcohol use. He reports that he does not use drugs.    Medications  Prior to Admission Medications   Prescriptions Last Dose Informant Patient Reported? Taking?   metoprolol tartrate (LOPRESSOR) 100 MG Tab 12/5/2021 at 1800 Patient Yes No   Sig: Take 100 mg by mouth 2 times a day.   therapeutic multivitamin-minerals (THERAGRAN-M) Tab 12/5/2021 at AM Patient Yes No   Sig: Take 1 Tablet by mouth every day.      Facility-Administered Medications: None       Allergies  No Known Allergies    Vital signs in last 24 hours  Temp:  [36.1 °C (97 °F)-36.3 °C (97.4 °F)] 36.2 °C (97.2 °F)  Pulse:  [60-70] 60  Resp:  [16-18] 16  BP: (100-131)/(59-87) 128/81  SpO2:  [91 %-93 %] 91 %    Physical Exam  Physical Exam  Constitutional:       General: He is not in acute distress.     Appearance: Normal appearance.   HENT:      Head: Normocephalic.      Mouth/Throat:      Mouth: Mucous membranes are dry.   Eyes:      Extraocular Movements: Extraocular movements intact.   Neck:      Vascular: No carotid bruit.   Cardiovascular:      Rate and Rhythm: Normal rate and regular rhythm.      Pulses: Normal pulses.      Heart sounds: Normal heart sounds. No murmur heard.      Pulmonary:      Effort: Pulmonary effort is normal.      Breath sounds: Normal breath sounds.   Abdominal:      General: There is no distension.       Palpations: Abdomen is soft.      Tenderness: There is no abdominal tenderness.   Musculoskeletal:         General: No swelling.      Cervical back: Normal range of motion.      Right lower leg: No edema.      Left lower leg: No edema.   Skin:     General: Skin is warm and dry.   Neurological:      Mental Status: He is alert and oriented to person, place, and time.   Psychiatric:         Mood and Affect: Mood normal.         Thought Content: Thought content normal.         Judgment: Judgment normal.         Lab Review  Lab Results   Component Value Date/Time    WBC 8.6 12/06/2021 01:28 AM    RBC 4.15 (L) 12/06/2021 01:28 AM    HEMOGLOBIN 12.8 (L) 12/06/2021 01:28 AM    HEMATOCRIT 37.4 (L) 12/06/2021 01:28 AM    MCV 90.1 12/06/2021 01:28 AM    MCH 30.8 12/06/2021 01:28 AM    MCHC 34.2 12/06/2021 01:28 AM    MPV 9.6 12/06/2021 01:28 AM      Lab Results   Component Value Date/Time    SODIUM 140 12/07/2021 01:20 AM    POTASSIUM 4.0 12/07/2021 01:20 AM    CHLORIDE 105 12/07/2021 01:20 AM    CO2 23 12/07/2021 01:20 AM    GLUCOSE 96 12/07/2021 01:20 AM    BUN 21 12/07/2021 01:20 AM    CREATININE 0.77 12/07/2021 01:20 AM      Lab Results   Component Value Date/Time    ASTSGOT 21 12/07/2021 01:20 AM    ALTSGPT 50 12/07/2021 01:20 AM     Lab Results   Component Value Date/Time    CHOLSTRLTOT 197 12/07/2021 01:20 AM     (H) 12/07/2021 01:20 AM    HDL 35 (A) 12/07/2021 01:20 AM    TRIGLYCERIDE 253 (H) 12/07/2021 01:20 AM    TROPONINT 55 (H) 12/06/2021 05:04 PM       Recent Labs     12/06/21  0128   NTPROBNP 1961*       Cardiac Imaging and Procedures Review  Echocardiogram 12/6/2021:  CONCLUSIONS  No prior study is available for comparison.   The left ventricular ejection fraction is visually estimated to be 60%.  Estimated right ventricular systolic pressure is 30 mmHg.    Assessment/Plan  ?SVT:  - Found to be multifocal SVT during EPS study on 12/3/2021, not AVNRT.     Multifocal Atrial Tachycardia:  - S/P ablation with  Dr. Pickett on 12/3/2021.  - Discussed with Dr. Pickett who feels that this will be better treated with medications only at this time.   - Patient is currently not tolerating lopressor, will try Toprol XL at slightly reduced dose of 100 mg every evening.   - Continue Flecainide 50 mg BID, interval remains stable on EKG this am.   - Patient will ambulate around the unit today to ensure he has no recurrence with exertion.     ? ENRICO:  - Outpatient sleep study pending.     Thank you for allowing me to participate in the care of this patient.    Close EP cardiology follow up has been arranged as below.     Please contact me with any questions.    Please see Dr. Pickett's attestation for additions and further recommendations.    Future Appointments   Date Time Provider Department Center   1/10/2022  7:40 AM Michael Pickett M.D. [facility] None     MINDI Talamantes.   Cardiology, [facility]  [phone]

## 2021-12-09 ENCOUNTER — OFFICE VISIT (OUTPATIENT)
Dept: CARDIOLOGY | Facility: MEDICAL CENTER | Age: 56
End: 2021-12-09
Payer: COMMERCIAL

## 2021-12-09 ENCOUNTER — TELEPHONE (OUTPATIENT)
Dept: CARDIOLOGY | Facility: MEDICAL CENTER | Age: 56
End: 2021-12-09

## 2021-12-09 VITALS
BODY MASS INDEX: 31.28 KG/M2 | OXYGEN SATURATION: 99 % | HEART RATE: 66 BPM | DIASTOLIC BLOOD PRESSURE: 80 MMHG | RESPIRATION RATE: 14 BRPM | HEIGHT: 73 IN | WEIGHT: 236 LBS | SYSTOLIC BLOOD PRESSURE: 130 MMHG

## 2021-12-09 DIAGNOSIS — I47.10 SVT (SUPRAVENTRICULAR TACHYCARDIA) (HCC): ICD-10-CM

## 2021-12-09 DIAGNOSIS — R94.31 NONSPECIFIC ABNORMAL ELECTROCARDIOGRAM (ECG) (EKG): ICD-10-CM

## 2021-12-09 PROCEDURE — 99215 OFFICE O/P EST HI 40 MIN: CPT | Performed by: INTERNAL MEDICINE

## 2021-12-09 PROCEDURE — 93000 ELECTROCARDIOGRAM COMPLETE: CPT | Performed by: INTERNAL MEDICINE

## 2021-12-09 ASSESSMENT — ENCOUNTER SYMPTOMS
FEVER: 0
COUGH: 0
CONSTIPATION: 0
DECREASED APPETITE: 0
BACK PAIN: 0
WEIGHT LOSS: 0
IRREGULAR HEARTBEAT: 0
CLAUDICATION: 0
BLURRED VISION: 0
DIARRHEA: 0
SYNCOPE: 0
NAUSEA: 0
ORTHOPNEA: 0
NEAR-SYNCOPE: 0
SHORTNESS OF BREATH: 0
DEPRESSION: 0
DYSPNEA ON EXERTION: 0
HEARTBURN: 0
DIZZINESS: 0
FLANK PAIN: 0
ABDOMINAL PAIN: 0
PND: 0
ALTERED MENTAL STATUS: 0
VOMITING: 0
PALPITATIONS: 1
WEIGHT GAIN: 0

## 2021-12-09 ASSESSMENT — FIBROSIS 4 INDEX: FIB4 SCORE: 0.93

## 2021-12-09 NOTE — PATIENT INSTRUCTIONS
High Triglycerides Eating Plan  Triglycerides are a type of fat in the blood. High levels of triglycerides can increase your risk of heart disease and stroke. If your triglyceride levels are high, choosing the right foods can help lower your triglycerides and keep your heart healthy. Work with your health care provider or a diet and nutrition specialist (dietitian) to develop an eating plan that is right for you.  What are tips for following this plan?  General guidelines    · Lose weight, if you are overweight. For most people, losing 5-10 lbs (2-5 kg) helps lower triglyceride levels. A weight-loss plan may include.  ? 30 minutes of exercise at least 5 days a week.  ? Reducing the amount of calories, sugar, and fat you eat.  · Eat a wide variety of fresh fruits, vegetables, and whole grains. These foods are high in fiber.  · Eat foods that contain healthy fats, such as fatty fish, nuts, seeds, and olive oil.  · Avoid foods that are high in added sugar, added salt (sodium), saturated fat, and trans fat.  · Avoid low-fiber, refined carbohydrates such as white bread, crackers, noodles, and white rice.  · Avoid foods with partially hydrogenated oils (trans fats), such as fried foods or stick margarine.  · Limit alcohol intake to no more than 1 drink a day for nonpregnant women and 2 drinks a day for men. One drink equals 12 oz of beer, 5 oz of wine, or 1½ oz of hard liquor. Your health care provider may recommend that you drink less depending on your overall health.  Reading food labels  · Check food labels for the amount of saturated fat. Choose foods with no or very little saturated fat.  · Check food labels for the amount of trans fat. Choose foods with no trans fat.  · Check food labels for the amount of cholesterol. Choose foods low in cholesterol. Ask your dietitian how much cholesterol you should have each day.  · Check food labels for the amount of sodium. Choose foods with less than 140 milligrams (mg) per  serving.  Shopping  · Buy dairy products labeled as nonfat (skim) or low-fat (1%).  · Avoid buying processed or prepackaged foods. These are often high in added sugar, sodium, and fat.  Cooking  · Choose healthy fats when cooking, such as olive oil or canola oil.  · Cook foods using lower fat methods, such as baking, broiling, boiling, or grilling.  · Make your own sauces, dressings, and marinades when possible, instead of buying them. Store-bought sauces, dressings, and marinades are often high in sodium and sugar.  Meal planning  · Eat more home-cooked food and less restaurant, buffet, and fast food.  · Eat fatty fish at least 2 times each week. Examples of fatty fish include salmon, trout, mackerel, tuna, and herring.  · If you eat whole eggs, do not eat more than 3 egg yolks per week.  What foods are recommended?  The items listed may not be a complete list. Talk with your dietitian about what dietary choices are best for you.  Grains  Whole wheat or whole grain breads, crackers, cereals, and pasta. Unsweetened oatmeal. Bulgur. Barley. Quinoa. Brown rice. Whole wheat flour tortillas.  Vegetables  Fresh or frozen vegetables. Low-sodium canned vegetables.  Fruits  All fresh, canned (in natural juice), or frozen fruits.  Meats and other protein foods  Skinless chicken or turkey. Ground chicken or turkey. Lean cuts of pork, trimmed of fat. Fish and seafood, especially salmon, trout, and herring. Egg whites. Dried beans, peas, or lentils. Unsalted nuts or seeds. Unsalted canned beans. Natural peanut or almond butter.  Dairy  Low-fat dairy products. Skim or low-fat (1%) milk. Reduced fat (2%) and low-sodium cheese. Low-fat ricotta cheese. Low-fat cottage cheese. Plain, low-fat yogurt.  Fats and oils  Tub margarine without trans fats. Light or reduced-fat mayonnaise. Light or reduced-fat salad dressings. Avocado. Safflower, olive, sunflower, soybean, and canola oils.  What foods are not recommended?  The items listed  may not be a complete list. Talk with your dietitian about what dietary choices are best for you.  Grains  White bread. White (regular) pasta. White rice. Cornbread. Bagels. Pastries. Crackers that contain trans fat.  Vegetables  Creamed or fried vegetables. Vegetables in a cheese sauce.  Fruits  Sweetened dried fruit. Canned fruit in syrup. Fruit juice.  Meats and other protein foods  Fatty cuts of meat. Ribs. Chicken wings. [place]el[place]. Sausage. Bologna. Salami. Chitterlings. Fatback. Hot dogs. Bratwurst. Packaged lunch meats.  Dairy  Whole or reduced-fat (2%) milk. Half-and-half. Cream cheese. Full-fat or sweetened yogurt. Full-fat cheese. Nondairy creamers. Whipped toppings. Processed cheese or cheese spreads. Cheese curds.  Beverages  Alcohol. Sweetened drinks, such as soda, lemonade, fruit drinks, or punches.  Fats and oils  Butter. Stick margarine. Lard. Shortening. Ghee. [place] fat. Tropical oils, such as coconut, palm kernel, or palm oils.  Sweets and desserts  Corn syrup. Sugars. Honey. Molasses. Candy. Jam and jelly. Syrup. Sweetened cereals. Cookies. Pies. Cakes. Donuts. Muffins. Ice cream.  Condiments  Store-bought sauces, dressings, and marinades that are high in sugar, such as ketchup and barbecue sauce.  Summary  · High levels of triglycerides can increase the risk of heart disease and stroke. Choosing the right foods can help lower your triglycerides.  · Eat plenty of fresh fruits, vegetables, and whole grains. Choose low-fat dairy and lean meats. Eat fatty fish at least twice a week.  · Avoid processed and prepackaged foods with added sugar, sodium, saturated fat, and trans fat.  · If you need suggestions or have questions about what types of food are good for you, talk with your health care provider or a dietitian.  This information is not intended to replace advice given to you by your health care provider. Make sure you discuss any questions you have with your health care provider.  Document Released:  10/05/2005 Document Revised: 11/30/2018 Document Reviewed: 02/20/2018  Elsevier Patient Education © 2020 [employer] Inc[employer]

## 2021-12-09 NOTE — PROGRESS NOTES
Cardiology Note    Chief Complaint   Patient presents with   • Supraventricular Tachycardia (SVT)   • Arrhythmia       History of Present Illness: Chung Griffiths is a 56 y.o. male PMH SVT s/p ablation 12/3/21 who presents for follow up.    Since ablation, has had recurrent SVT. Reminds the night of his heart was racing. Has since had more palpitations as well. Attempted resuming metoprolol. Thereafter more recurrence which led to repeat ED visit. Latest regimen metoprolol and flecainide. Arrhythmia controlled so far on this regimen. Works laborous job in farm equipment. Feels due to medications has had to slow down. Has follow up with EP. Found to have ENRICO. Started on cpap.     Review of Systems   Constitutional: Negative for decreased appetite, fever, malaise/fatigue, weight gain and weight loss.   HENT: Negative for congestion and nosebleeds.    Eyes: Negative for blurred vision.   Cardiovascular: Positive for palpitations. Negative for chest pain, claudication, dyspnea on exertion, irregular heartbeat, leg swelling, near-syncope, orthopnea, paroxysmal nocturnal dyspnea and syncope.   Respiratory: Negative for cough and shortness of breath.    Endocrine: Negative for cold intolerance and heat intolerance.   Skin: Negative for rash.   Musculoskeletal: Negative for back pain.   Gastrointestinal: Negative for abdominal pain, constipation, diarrhea, heartburn, melena, nausea and vomiting.   Genitourinary: Negative for dysuria, flank pain and hematuria.   Neurological: Negative for dizziness.   Psychiatric/Behavioral: Negative for altered mental status and depression.         Past Medical History:   Diagnosis Date   • Arrhythmia 11/17/2021    SVT   • Breath shortness 11/17/2021    SOB while sleeping   • Indigestion 11/17/2021    occ   • Pain 11/17/2021    L5-S1 disc   • Sleep apnea     Appointment on 11/23/21         Past Surgical History:   Procedure Laterality Date   • OTHER ORTHOPEDIC SURGERY      right knee          Current Outpatient Medications   Medication Sig Dispense Refill   • flecainide (TAMBOCOR) 50 MG tablet Take 1 Tablet by mouth 2 times a day for 30 days. 60 Tablet 0   • metoprolol SR (TOPROL XL) 100 MG TABLET SR 24 HR Take 1 Tablet by mouth every evening for 30 days. 30 Tablet 0   • therapeutic multivitamin-minerals (THERAGRAN-M) Tab Take 1 Tablet by mouth every day for 30 days. 30 Tablet 0     No current facility-administered medications for this visit.         No Known Allergies      History reviewed. No pertinent family history.      Social History     Socioeconomic History   • Marital status:      Spouse name: Not on file   • Number of children: Not on file   • Years of education: Not on file   • Highest education level: Not on file   Occupational History   • Not on file   Tobacco Use   • Smoking status: Never Smoker   • Smokeless tobacco: Never Used   Vaping Use   • Vaping Use: Never used   Substance and Sexual Activity   • Alcohol use: Yes     Comment: 6-8 per week   • Drug use: Never   • Sexual activity: Not on file   Other Topics Concern   • Not on file   Social History Narrative   • Not on file     Social Determinants of Health     Financial Resource Strain:    • Difficulty of Paying Living Expenses: Not on file   Food Insecurity:    • Worried About Running Out of Food in the Last Year: Not on file   • Ran Out of Food in the Last Year: Not on file   Transportation Needs:    • Lack of Transportation (Medical): Not on file   • Lack of Transportation (Non-Medical): Not on file   Physical Activity:    • Days of Exercise per Week: Not on file   • Minutes of Exercise per Session: Not on file   Stress:    • Feeling of Stress : Not on file   Social Connections:    • Frequency of Communication with Friends and Family: Not on file   • Frequency of Social Gatherings with Friends and Family: Not on file   • Attends Muslim Services: Not on file   • Active Member of Clubs or Organizations: Not on file  "  • Attends Club or Organization Meetings: Not on file   • Marital Status: Not on file   Intimate Partner Violence:    • Fear of Current or Ex-Partner: Not on file   • Emotionally Abused: Not on file   • Physically Abused: Not on file   • Sexually Abused: Not on file   Housing Stability:    • Unable to Pay for Housing in the Last Year: Not on file   • Number of Places Lived in the Last Year: Not on file   • Unstable Housing in the Last Year: Not on file         Physical Exam:  Ambulatory Vitals  /80 (BP Location: Left arm, Patient Position: Sitting, BP Cuff Size: Adult)   Pulse 66   Resp 14   Ht 1.854 m (6' 1\")   Wt 107 kg (236 lb)   SpO2 99%    BP Readings from Last 4 Encounters:   12/09/21 130/80   12/07/21 114/83   12/05/21 113/81   12/03/21 117/66     Weight/BMI:   Vitals:    12/09/21 1431   BP: 130/80   Weight: 107 kg (236 lb)   Height: 1.854 m (6' 1\")    Body mass index is 31.14 kg/m².  Wt Readings from Last 4 Encounters:   12/09/21 107 kg (236 lb)   12/05/21 108 kg (237 lb 14 oz)   12/05/21 110 kg (241 lb 6.5 oz)   12/03/21 106 kg (233 lb 4 oz)       Physical Exam  Constitutional:       General: He is not in acute distress.  HENT:      Head: Normocephalic and atraumatic.   Eyes:      Conjunctiva/sclera: Conjunctivae normal.      Pupils: Pupils are equal, round, and reactive to light.   Neck:      Vascular: No JVD.   Cardiovascular:      Rate and Rhythm: Normal rate and regular rhythm.      Heart sounds: Normal heart sounds. No murmur heard.  No friction rub. No gallop.    Pulmonary:      Effort: Pulmonary effort is normal. No respiratory distress.      Breath sounds: Normal breath sounds. No wheezing or rales.   Chest:      Chest wall: No tenderness.   Abdominal:      General: Bowel sounds are normal. There is no distension.      Palpations: Abdomen is soft.   Musculoskeletal:      Cervical back: Normal range of motion and neck supple.   Skin:     General: Skin is warm and dry.   Neurological:      " Mental Status: He is alert and oriented to person, place, and time.   Psychiatric:         Mood and Affect: Affect normal.         Judgment: Judgment normal.         Lab Data Review:  Lab Results   Component Value Date/Time    CHOLSTRLTOT 197 12/07/2021 01:20 AM     (H) 12/07/2021 01:20 AM    HDL 35 (A) 12/07/2021 01:20 AM    TRIGLYCERIDE 253 (H) 12/07/2021 01:20 AM       Lab Results   Component Value Date/Time    SODIUM 140 12/07/2021 01:20 AM    POTASSIUM 4.0 12/07/2021 01:20 AM    CHLORIDE 105 12/07/2021 01:20 AM    CO2 23 12/07/2021 01:20 AM    GLUCOSE 96 12/07/2021 01:20 AM    BUN 21 12/07/2021 01:20 AM    CREATININE 0.77 12/07/2021 01:20 AM     Estimated Creatinine Clearance: 134.2 mL/min (by C-G formula based on SCr of 0.77 mg/dL).  Lab Results   Component Value Date/Time    ALKPHOSPHAT 118 (H) 12/07/2021 01:20 AM    ASTSGOT 21 12/07/2021 01:20 AM    ALTSGPT 50 12/07/2021 01:20 AM    TBILIRUBIN 0.5 12/07/2021 01:20 AM      Lab Results   Component Value Date/Time    WBC 8.6 12/06/2021 01:28 AM     Lab Results   Component Value Date/Time    HBA1C 5.2 12/06/2021 01:28 AM     No components found for: TROP      Cardiac Imaging and Procedures Review:      EKG 12/9/21 interpreted by me sinus 71, LAFB, abnormal T wave possible ischemia    TTE 12/6/21   Left Ventricle  Normal left ventricular chamber size. Normal left ventricular wall   thickness. Normal left ventricular systolic function. The left   ventricular ejection fraction is visually estimated to be 60%. Normal   regional wall motion. Normal diastolic function.  CONCLUSIONS  No prior study is available for comparison.   The left ventricular ejection fraction is visually estimated to be 60%.  Estimated right ventricular systolic pressure is 30 mmHg.    Medical Decision Making:  Problem List Items Addressed This Visit     SVT (supraventricular tachycardia) (HCC)    Nonspecific abnormal electrocardiogram (ECG) (EKG)    Relevant Orders    CT-CTA HEART W/3D  IMAGE        EKG with abnormal T waves - check CCTA.    Paroxysmal SVT - follow up with EP. Allow time interval for healing post ablation. Continue medical regimen with rate and rhythm control for now. Consider definitive treatment not involving medical therapy as limits work stamina.     It was my pleasure to meet with Mr. Griffiths.

## 2021-12-10 LAB — EKG IMPRESSION: NORMAL

## 2022-01-03 ENCOUNTER — TELEPHONE (OUTPATIENT)
Dept: CARDIOLOGY | Facility: MEDICAL CENTER | Age: 57
End: 2022-01-03

## 2022-01-03 RX ORDER — METOPROLOL SUCCINATE 100 MG/1
100 TABLET, EXTENDED RELEASE ORAL EVERY EVENING
Qty: 90 TABLET | Refills: 1 | Status: SHIPPED | OUTPATIENT
Start: 2022-01-03 | End: 2022-04-27

## 2022-01-03 RX ORDER — FLECAINIDE ACETATE 50 MG/1
50 TABLET ORAL 2 TIMES DAILY
Qty: 180 TABLET | Refills: 1 | Status: SHIPPED | OUTPATIENT
Start: 2022-01-03 | End: 2022-02-01

## 2022-01-03 NOTE — TELEPHONE ENCOUNTER
VR    Pt called needing refills of flecainide (TAMBOCOR) 50 MG tablet and metoprolol SR (TOPROL XL) 100 MG TABLET SR 24 HR sent to [facility] pharmacy in [place]. Please send Pt a MyChart when the prescriptions have been sent to the pharmacy.    Thank you

## 2022-01-10 ENCOUNTER — OFFICE VISIT (OUTPATIENT)
Dept: CARDIOLOGY | Facility: MEDICAL CENTER | Age: 57
End: 2022-01-10
Payer: COMMERCIAL

## 2022-01-10 ENCOUNTER — TELEPHONE (OUTPATIENT)
Dept: CARDIOLOGY | Facility: MEDICAL CENTER | Age: 57
End: 2022-01-10

## 2022-01-10 ENCOUNTER — PATIENT MESSAGE (OUTPATIENT)
Dept: CARDIOLOGY | Facility: MEDICAL CENTER | Age: 57
End: 2022-01-10

## 2022-01-10 VITALS
RESPIRATION RATE: 16 BRPM | OXYGEN SATURATION: 99 % | HEART RATE: 92 BPM | DIASTOLIC BLOOD PRESSURE: 70 MMHG | SYSTOLIC BLOOD PRESSURE: 132 MMHG | HEIGHT: 73 IN | WEIGHT: 239 LBS | BODY MASS INDEX: 31.68 KG/M2

## 2022-01-10 DIAGNOSIS — I47.10 SVT (SUPRAVENTRICULAR TACHYCARDIA) (HCC): ICD-10-CM

## 2022-01-10 LAB — EKG IMPRESSION: NORMAL

## 2022-01-10 PROCEDURE — 99214 OFFICE O/P EST MOD 30 MIN: CPT | Performed by: INTERNAL MEDICINE

## 2022-01-10 PROCEDURE — 93000 ELECTROCARDIOGRAM COMPLETE: CPT | Performed by: INTERNAL MEDICINE

## 2022-01-10 ASSESSMENT — FIBROSIS 4 INDEX: FIB4 SCORE: 0.93

## 2022-01-10 NOTE — PROGRESS NOTES
Arrhythmia Clinic Note (Established patient)    DOS: 1/10/2022    Chief complaint/Reason for consult: MAT    Interval History: 57 y/o M with multifocal RA AT s/p attempted ablation 5 weeks ago with recurrence post-op now on Flecainide. Well managed but he is having side effects of blurred vision and fogginess.    ROS (+ highlighted in bold):  Constitutional: Fevers/chills/fatigue/weightloss  HEENT: Blurry vision/eye pain/sore throat/hearing loss  Respiratory: Shortness of breath/cough  Cardiovascular: Chest pain/palpitations/edema/orthopnea/syncope  GI: Nausea/vomitting/diarrhea  MSK: Arthralgias/myagias/muscle weakness  Skin: Rash/sores  Neurological: Numbness/tremors/vertigo  Endocrine: Excessive thirst/polyuria/cold intolerance/heat intolerance  Psych: Depression/anxiety    Past Medical History:   Diagnosis Date   • Arrhythmia 11/17/2021    SVT   • Breath shortness 11/17/2021    SOB while sleeping   • Indigestion 11/17/2021    occ   • Pain 11/17/2021    L5-S1 disc   • Sleep apnea     Appointment on 11/23/21       Past Surgical History:   Procedure Laterality Date   • OTHER ORTHOPEDIC SURGERY      right knee       Social History     Socioeconomic History   • Marital status:      Spouse name: Not on file   • Number of children: Not on file   • Years of education: Not on file   • Highest education level: Not on file   Occupational History   • Not on file   Tobacco Use   • Smoking status: Never Smoker   • Smokeless tobacco: Never Used   Vaping Use   • Vaping Use: Never used   Substance and Sexual Activity   • Alcohol use: Yes     Comment: 6-8 per week   • Drug use: Never   • Sexual activity: Not on file   Other Topics Concern   • Not on file   Social History Narrative   • Not on file     Social Determinants of Health     Financial Resource Strain:    • Difficulty of Paying Living Expenses: Not on file   Food Insecurity:    • Worried About Running Out of Food in the Last Year: Not on file   • Ran Out of Food in  "the Last Year: Not on file   Transportation Needs:    • Lack of Transportation (Medical): Not on file   • Lack of Transportation (Non-Medical): Not on file   Physical Activity:    • Days of Exercise per Week: Not on file   • Minutes of Exercise per Session: Not on file   Stress:    • Feeling of Stress : Not on file   Social Connections:    • Frequency of Communication with Friends and Family: Not on file   • Frequency of Social Gatherings with Friends and Family: Not on file   • Attends [employer] Services: Not on file   • Active Member of Clubs or Organizations: Not on file   • Attends Club or Organization Meetings: Not on file   • Marital Status: Not on file   Intimate Partner Violence:    • Fear of Current or Ex-Partner: Not on file   • Emotionally Abused: Not on file   • Physically Abused: Not on file   • Sexually Abused: Not on file   Housing Stability:    • Unable to Pay for Housing in the Last Year: Not on file   • Number of Places Lived in the Last Year: Not on file   • Unstable Housing in the Last Year: Not on file       No family history on file.    No Known Allergies    Current Outpatient Medications   Medication Sig Dispense Refill   • metoprolol SR (TOPROL XL) 100 MG TABLET SR 24 HR Take 1 Tablet by mouth every evening. 90 Tablet 1   • flecainide (TAMBOCOR) 50 MG tablet Take 1 Tablet by mouth 2 times a day. 180 Tablet 1     No current facility-administered medications for this visit.       Physical Exam:  Vitals:    01/10/22 0730   BP: 132/70   BP Location: Left arm   Patient Position: Sitting   BP Cuff Size: Adult   Pulse: 92   Resp: 16   SpO2: 99%   Weight: 108 kg (239 lb)   Height: 1.854 m (6' 1\")     General appearance: NAD, conversant   Eyes: anicteric sclerae, moist conjunctivae; no lid-lag; PERRLA  HENT: Atraumatic; oropharynx clear with moist mucous membranes and no mucosal ulcerations; normal hard and soft palate  Neck: Trachea midline; FROM, supple, no thyromegaly or lymphadenopathy  Lungs: " CTA, with normal respiratory effort and no intercostal retractions  CV: RRR, no MRGs, no JVD  Abdomen: Soft, non-tender; no masses or HSM  Extremities: No peripheral edema or extremity lymphadenopathy  Skin: Normal temperature, turgor and texture; no rash, ulcers or subcutaneous nodules  Psych: Appropriate affect, alert and oriented to person, place and time    Data:  Lipids:   Lab Results   Component Value Date/Time    CHOLSTRLTOT 197 12/07/2021 01:20 AM    TRIGLYCERIDE 253 (H) 12/07/2021 01:20 AM    HDL 35 (A) 12/07/2021 01:20 AM     (H) 12/07/2021 01:20 AM        BMP:  Lab Results   Component Value Date/Time    SODIUM 140 12/07/2021 0120    POTASSIUM 4.0 12/07/2021 0120    CHLORIDE 105 12/07/2021 0120    CO2 23 12/07/2021 0120    GLUCOSE 96 12/07/2021 0120    BUN 21 12/07/2021 0120    CREATININE 0.77 12/07/2021 0120    CALCIUM 9.1 12/07/2021 0120    ANION 12.0 12/07/2021 0120        TSH:   Lab Results   Component Value Date/Time    TSHULTRASEN 4.640 12/06/2021 0128        THYROXINE (T4):   No results found for: GUNNAR     CBC:   Lab Results   Component Value Date/Time    WBC 8.6 12/06/2021 01:28 AM    RBC 4.15 (L) 12/06/2021 01:28 AM    HEMOGLOBIN 12.8 (L) 12/06/2021 01:28 AM    HEMATOCRIT 37.4 (L) 12/06/2021 01:28 AM    MCV 90.1 12/06/2021 01:28 AM    MCH 30.8 12/06/2021 01:28 AM    MCHC 34.2 12/06/2021 01:28 AM    RDW 46.8 12/06/2021 01:28 AM    PLATELETCT 185 12/06/2021 01:28 AM    MPV 9.6 12/06/2021 01:28 AM    NEUTSPOLYS 66.60 12/03/2021 08:20 AM    LYMPHOCYTES 24.80 12/03/2021 08:20 AM    MONOCYTES 7.60 12/03/2021 08:20 AM    EOSINOPHILS 0.60 12/03/2021 08:20 AM    BASOPHILS 0.20 12/03/2021 08:20 AM    IMMGRAN 0.20 12/03/2021 08:20 AM    NRBC 0.00 12/03/2021 08:20 AM    NEUTS 6.75 12/03/2021 08:20 AM    LYMPHS 2.51 12/03/2021 08:20 AM    MONOS 0.77 12/03/2021 08:20 AM    EOS 0.06 12/03/2021 08:20 AM    BASO 0.02 12/03/2021 08:20 AM    IMMGRANAB 0.02 12/03/2021 08:20 AM    NRBCAB 0.00 12/03/2021  08:20 AM        CBC w/o DIFF  Lab Results   Component Value Date/Time    WBC 8.6 12/06/2021 01:28 AM    RBC 4.15 (L) 12/06/2021 01:28 AM    HEMOGLOBIN 12.8 (L) 12/06/2021 01:28 AM    MCV 90.1 12/06/2021 01:28 AM    MCH 30.8 12/06/2021 01:28 AM    MCHC 34.2 12/06/2021 01:28 AM    RDW 46.8 12/06/2021 01:28 AM    MPV 9.6 12/06/2021 01:28 AM       Prior echo/stress reviewed: Normal echo    Pt reports normal stress test in LV    EKG interpreted by me: SR    Impression/Plan:  1. SVT (supraventricular tachycardia) (HCC)  EKG     1. Multifocal AT  2. Flecainide use, high risk medication    - Continue Flecainide and metoprolol. He will start to decrease the doses to see if side effects are improved. If he has recurrence we did discuss trying Rhythmol instead for potentially better side effect profile.    See me again in 3 months    Michael Pickett MD  Cardiac Electrophysiology

## 2022-01-10 NOTE — TELEPHONE ENCOUNTER
[facility]     Records received. scanned into chart via [employer].

## 2022-01-19 NOTE — PATIENT COMMUNICATION
Discussed over the phone with pt.     Pt had decreased bb to 50mg BID d/t fatigue in conjunction with his new med for his back (Diclofenac). Discussed possible med changes per [facility] recommendation. Pt would like to get back to baseline on the metop to see if that improves his rates then look at changing the flec. Pt still having some blurred vision on the flec but would like to address his rate first.     Pt to take full dose of metop and flec, pt will check in tomorrow if still having rate issues and update Friday either way. Pt eventually would like to change off of flec due to vision issues.

## 2022-01-21 ENCOUNTER — PATIENT MESSAGE (OUTPATIENT)
Dept: CARDIOLOGY | Facility: MEDICAL CENTER | Age: 57
End: 2022-01-21

## 2022-02-01 RX ORDER — PROPAFENONE HYDROCHLORIDE 150 MG/1
150 TABLET, COATED ORAL EVERY 8 HOURS
Qty: 90 TABLET | Refills: 3 | Status: SHIPPED | OUTPATIENT
Start: 2022-02-01 | End: 2022-03-31

## 2022-02-25 PROBLEM — G47.33 OBSTRUCTIVE SLEEP APNEA SYNDROME: Status: ACTIVE | Noted: 2022-02-25

## 2022-05-23 DIAGNOSIS — I47.10 SVT (SUPRAVENTRICULAR TACHYCARDIA) (HCC): ICD-10-CM

## 2022-05-23 RX ORDER — METOPROLOL SUCCINATE 100 MG/1
100 TABLET, EXTENDED RELEASE ORAL EVERY EVENING
Qty: 90 TABLET | Refills: 2 | Status: CANCELLED | OUTPATIENT
Start: 2022-05-23

## 2022-05-24 RX ORDER — METOPROLOL SUCCINATE 100 MG/1
100 TABLET, EXTENDED RELEASE ORAL EVERY EVENING
Qty: 90 TABLET | Refills: 3 | OUTPATIENT
Start: 2022-05-24

## 2022-11-11 ENCOUNTER — HOSPITAL ENCOUNTER (OUTPATIENT)
Dept: RADIOLOGY | Facility: MEDICAL CENTER | Age: 57
DRG: 455 | End: 2022-11-11
Attending: NEUROLOGICAL SURGERY | Admitting: NEUROLOGICAL SURGERY
Payer: COMMERCIAL

## 2022-11-11 ENCOUNTER — PRE-ADMISSION TESTING (OUTPATIENT)
Dept: ADMISSIONS | Facility: MEDICAL CENTER | Age: 57
DRG: 455 | End: 2022-11-11
Attending: NEUROLOGICAL SURGERY
Payer: COMMERCIAL

## 2022-11-11 DIAGNOSIS — Z01.812 PRE-OPERATIVE LABORATORY EXAMINATION: ICD-10-CM

## 2022-11-11 DIAGNOSIS — R94.31 NONSPECIFIC ABNORMAL ELECTROCARDIOGRAM (ECG) (EKG): ICD-10-CM

## 2022-11-11 DIAGNOSIS — Z01.811 PRE-OPERATIVE RESPIRATORY EXAMINATION: ICD-10-CM

## 2022-11-11 DIAGNOSIS — Z01.810 PRE-OPERATIVE CARDIOVASCULAR EXAMINATION: ICD-10-CM

## 2022-11-11 DIAGNOSIS — R82.90 NONSPECIFIC FINDING ON EXAMINATION OF URINE: ICD-10-CM

## 2022-11-11 DIAGNOSIS — M51.36 DEGENERATION OF LUMBAR INTERVERTEBRAL DISC: ICD-10-CM

## 2022-11-11 DIAGNOSIS — R79.1 ABNORMAL COAGULATION PROFILE: ICD-10-CM

## 2022-11-11 DIAGNOSIS — M43.16 SPONDYLOLISTHESIS OF LUMBAR REGION: ICD-10-CM

## 2022-11-11 LAB
ANION GAP SERPL CALC-SCNC: 12 MMOL/L (ref 7–16)
APPEARANCE UR: CLEAR
APTT PPP: 24.6 SEC (ref 24.7–36)
BACTERIA #/AREA URNS HPF: NEGATIVE /HPF
BASOPHILS # BLD AUTO: 0.4 % (ref 0–1.8)
BASOPHILS # BLD: 0.03 K/UL (ref 0–0.12)
BILIRUB UR QL STRIP.AUTO: NEGATIVE
BUN SERPL-MCNC: 25 MG/DL (ref 8–22)
CALCIUM SERPL-MCNC: 9.7 MG/DL (ref 8.5–10.5)
CHLORIDE SERPL-SCNC: 103 MMOL/L (ref 96–112)
CO2 SERPL-SCNC: 24 MMOL/L (ref 20–33)
COLOR UR: YELLOW
CREAT SERPL-MCNC: 0.91 MG/DL (ref 0.5–1.4)
EKG IMPRESSION: NORMAL
EOSINOPHIL # BLD AUTO: 0.08 K/UL (ref 0–0.51)
EOSINOPHIL NFR BLD: 1.1 % (ref 0–6.9)
EPI CELLS #/AREA URNS HPF: NEGATIVE /HPF
ERYTHROCYTE [DISTWIDTH] IN BLOOD BY AUTOMATED COUNT: 43 FL (ref 35.9–50)
GFR SERPLBLD CREATININE-BSD FMLA CKD-EPI: 97 ML/MIN/1.73 M 2
GLUCOSE SERPL-MCNC: 89 MG/DL (ref 65–99)
GLUCOSE UR STRIP.AUTO-MCNC: NEGATIVE MG/DL
HCT VFR BLD AUTO: 45.3 % (ref 42–52)
HGB BLD-MCNC: 15.4 G/DL (ref 14–18)
HYALINE CASTS #/AREA URNS LPF: ABNORMAL /LPF
IMM GRANULOCYTES # BLD AUTO: 0.02 K/UL (ref 0–0.11)
IMM GRANULOCYTES NFR BLD AUTO: 0.3 % (ref 0–0.9)
INR PPP: 0.99 (ref 0.87–1.13)
KETONES UR STRIP.AUTO-MCNC: NEGATIVE MG/DL
LEUKOCYTE ESTERASE UR QL STRIP.AUTO: ABNORMAL
LYMPHOCYTES # BLD AUTO: 2.14 K/UL (ref 1–4.8)
LYMPHOCYTES NFR BLD: 30.6 % (ref 22–41)
MCH RBC QN AUTO: 31.1 PG (ref 27–33)
MCHC RBC AUTO-ENTMCNC: 34 G/DL (ref 33.7–35.3)
MCV RBC AUTO: 91.5 FL (ref 81.4–97.8)
MICRO URNS: ABNORMAL
MONOCYTES # BLD AUTO: 0.44 K/UL (ref 0–0.85)
MONOCYTES NFR BLD AUTO: 6.3 % (ref 0–13.4)
NEUTROPHILS # BLD AUTO: 4.29 K/UL (ref 1.82–7.42)
NEUTROPHILS NFR BLD: 61.3 % (ref 44–72)
NITRITE UR QL STRIP.AUTO: NEGATIVE
NRBC # BLD AUTO: 0 K/UL
NRBC BLD-RTO: 0 /100 WBC
PH UR STRIP.AUTO: 5.5 [PH] (ref 5–8)
PLATELET # BLD AUTO: 198 K/UL (ref 164–446)
PMV BLD AUTO: 10 FL (ref 9–12.9)
POTASSIUM SERPL-SCNC: 4.4 MMOL/L (ref 3.6–5.5)
PROT UR QL STRIP: NEGATIVE MG/DL
PROTHROMBIN TIME: 13 SEC (ref 12–14.6)
RBC # BLD AUTO: 4.95 M/UL (ref 4.7–6.1)
RBC # URNS HPF: ABNORMAL /HPF
RBC UR QL AUTO: NEGATIVE
SODIUM SERPL-SCNC: 139 MMOL/L (ref 135–145)
SP GR UR STRIP.AUTO: 1.03
UROBILINOGEN UR STRIP.AUTO-MCNC: 0.2 MG/DL
WBC # BLD AUTO: 7 K/UL (ref 4.8–10.8)
WBC #/AREA URNS HPF: ABNORMAL /HPF

## 2022-11-11 PROCEDURE — 81001 URINALYSIS AUTO W/SCOPE: CPT

## 2022-11-11 PROCEDURE — 85610 PROTHROMBIN TIME: CPT

## 2022-11-11 PROCEDURE — 72110 X-RAY EXAM L-2 SPINE 4/>VWS: CPT

## 2022-11-11 PROCEDURE — 85730 THROMBOPLASTIN TIME PARTIAL: CPT

## 2022-11-11 PROCEDURE — 93005 ELECTROCARDIOGRAM TRACING: CPT

## 2022-11-11 PROCEDURE — 80048 BASIC METABOLIC PNL TOTAL CA: CPT

## 2022-11-11 PROCEDURE — 71046 X-RAY EXAM CHEST 2 VIEWS: CPT

## 2022-11-11 PROCEDURE — 36415 COLL VENOUS BLD VENIPUNCTURE: CPT

## 2022-11-11 PROCEDURE — 93010 ELECTROCARDIOGRAM REPORT: CPT | Performed by: INTERNAL MEDICINE

## 2022-11-11 PROCEDURE — 85025 COMPLETE CBC W/AUTO DIFF WBC: CPT

## 2022-11-11 RX ORDER — TRAMADOL HYDROCHLORIDE 50 MG/1
50 TABLET ORAL EVERY 12 HOURS PRN
Status: ON HOLD | COMMUNITY
Start: 2022-11-03 | End: 2022-12-02

## 2022-11-11 ASSESSMENT — FIBROSIS 4 INDEX: FIB4 SCORE: 0.95

## 2022-11-30 ENCOUNTER — APPOINTMENT (OUTPATIENT)
Dept: RADIOLOGY | Facility: MEDICAL CENTER | Age: 57
DRG: 455 | End: 2022-11-30
Attending: NEUROLOGICAL SURGERY
Payer: COMMERCIAL

## 2022-11-30 ENCOUNTER — ANESTHESIA EVENT (OUTPATIENT)
Dept: SURGERY | Facility: MEDICAL CENTER | Age: 57
DRG: 455 | End: 2022-11-30
Payer: COMMERCIAL

## 2022-11-30 ENCOUNTER — ANESTHESIA (OUTPATIENT)
Dept: SURGERY | Facility: MEDICAL CENTER | Age: 57
DRG: 455 | End: 2022-11-30
Payer: COMMERCIAL

## 2022-11-30 ENCOUNTER — HOSPITAL ENCOUNTER (INPATIENT)
Facility: MEDICAL CENTER | Age: 57
LOS: 2 days | DRG: 455 | End: 2022-12-02
Attending: NEUROLOGICAL SURGERY | Admitting: NEUROLOGICAL SURGERY
Payer: COMMERCIAL

## 2022-11-30 DIAGNOSIS — G89.18 ACUTE POSTOPERATIVE PAIN: ICD-10-CM

## 2022-11-30 PROBLEM — M47.26 OTHER SPONDYLOSIS WITH RADICULOPATHY, LUMBAR REGION: Status: ACTIVE | Noted: 2022-11-30

## 2022-11-30 LAB
ABO + RH BLD: NORMAL
ABO GROUP BLD: NORMAL
BLD GP AB SCN SERPL QL: NORMAL
RH BLD: NORMAL

## 2022-11-30 PROCEDURE — 160002 HCHG RECOVERY MINUTES (STAT): Performed by: NEUROLOGICAL SURGERY

## 2022-11-30 PROCEDURE — A9270 NON-COVERED ITEM OR SERVICE: HCPCS | Performed by: PHYSICIAN ASSISTANT

## 2022-11-30 PROCEDURE — 700105 HCHG RX REV CODE 258: Performed by: NEUROLOGICAL SURGERY

## 2022-11-30 PROCEDURE — 160031 HCHG SURGERY MINUTES - 1ST 30 MINS LEVEL 5: Performed by: NEUROLOGICAL SURGERY

## 2022-11-30 PROCEDURE — 95940 IONM IN OPERATNG ROOM 15 MIN: CPT | Performed by: NEUROLOGICAL SURGERY

## 2022-11-30 PROCEDURE — 700102 HCHG RX REV CODE 250 W/ 637 OVERRIDE(OP): Performed by: ANESTHESIOLOGY

## 2022-11-30 PROCEDURE — 160009 HCHG ANES TIME/MIN: Performed by: NEUROLOGICAL SURGERY

## 2022-11-30 PROCEDURE — 95861 NEEDLE EMG 2 EXTREMITIES: CPT | Performed by: NEUROLOGICAL SURGERY

## 2022-11-30 PROCEDURE — 770001 HCHG ROOM/CARE - MED/SURG/GYN PRIV*

## 2022-11-30 PROCEDURE — 160036 HCHG PACU - EA ADDL 30 MINS PHASE I: Performed by: NEUROLOGICAL SURGERY

## 2022-11-30 PROCEDURE — 110454 HCHG SHELL REV 250: Performed by: NEUROLOGICAL SURGERY

## 2022-11-30 PROCEDURE — 74018 RADEX ABDOMEN 1 VIEW: CPT

## 2022-11-30 PROCEDURE — 00670 ANES XTNSV SP&SPI CORD PX: CPT | Performed by: ANESTHESIOLOGY

## 2022-11-30 PROCEDURE — C1713 ANCHOR/SCREW BN/BN,TIS/BN: HCPCS | Performed by: NEUROLOGICAL SURGERY

## 2022-11-30 PROCEDURE — 86850 RBC ANTIBODY SCREEN: CPT

## 2022-11-30 PROCEDURE — 36415 COLL VENOUS BLD VENIPUNCTURE: CPT

## 2022-11-30 PROCEDURE — 86901 BLOOD TYPING SEROLOGIC RH(D): CPT

## 2022-11-30 PROCEDURE — 95937 NEUROMUSCULAR JUNCTION TEST: CPT | Performed by: NEUROLOGICAL SURGERY

## 2022-11-30 PROCEDURE — 700111 HCHG RX REV CODE 636 W/ 250 OVERRIDE (IP): Performed by: PHYSICIAN ASSISTANT

## 2022-11-30 PROCEDURE — 700111 HCHG RX REV CODE 636 W/ 250 OVERRIDE (IP): Performed by: ANESTHESIOLOGY

## 2022-11-30 PROCEDURE — 72100 X-RAY EXAM L-S SPINE 2/3 VWS: CPT

## 2022-11-30 PROCEDURE — 95938 SOMATOSENSORY TESTING: CPT | Performed by: NEUROLOGICAL SURGERY

## 2022-11-30 PROCEDURE — 502000 HCHG MISC OR IMPLANTS RC 0278: Performed by: NEUROLOGICAL SURGERY

## 2022-11-30 PROCEDURE — 160042 HCHG SURGERY MINUTES - EA ADDL 1 MIN LEVEL 5: Performed by: NEUROLOGICAL SURGERY

## 2022-11-30 PROCEDURE — 160048 HCHG OR STATISTICAL LEVEL 1-5: Performed by: NEUROLOGICAL SURGERY

## 2022-11-30 PROCEDURE — 700105 HCHG RX REV CODE 258: Performed by: PHYSICIAN ASSISTANT

## 2022-11-30 PROCEDURE — 700101 HCHG RX REV CODE 250: Performed by: NEUROLOGICAL SURGERY

## 2022-11-30 PROCEDURE — A9270 NON-COVERED ITEM OR SERVICE: HCPCS | Performed by: ANESTHESIOLOGY

## 2022-11-30 PROCEDURE — 700101 HCHG RX REV CODE 250: Performed by: ANESTHESIOLOGY

## 2022-11-30 PROCEDURE — 700102 HCHG RX REV CODE 250 W/ 637 OVERRIDE(OP): Performed by: PHYSICIAN ASSISTANT

## 2022-11-30 PROCEDURE — 0SG30A0 FUSION OF LUMBOSACRAL JOINT WITH INTERBODY FUSION DEVICE, ANTERIOR APPROACH, ANTERIOR COLUMN, OPEN APPROACH: ICD-10-PCS | Performed by: NEUROLOGICAL SURGERY

## 2022-11-30 PROCEDURE — 160035 HCHG PACU - 1ST 60 MINS PHASE I: Performed by: NEUROLOGICAL SURGERY

## 2022-11-30 PROCEDURE — 86900 BLOOD TYPING SEROLOGIC ABO: CPT

## 2022-11-30 DEVICE — ORTHOBLEND 5CC: Type: IMPLANTABLE DEVICE | Site: ABDOMEN | Status: FUNCTIONAL

## 2022-11-30 DEVICE — IMPLANTABLE DEVICE: Type: IMPLANTABLE DEVICE | Site: ABDOMEN | Status: FUNCTIONAL

## 2022-11-30 RX ORDER — ONDANSETRON 2 MG/ML
4 INJECTION INTRAMUSCULAR; INTRAVENOUS
Status: DISCONTINUED | OUTPATIENT
Start: 2022-11-30 | End: 2022-11-30 | Stop reason: HOSPADM

## 2022-11-30 RX ORDER — MEPERIDINE HYDROCHLORIDE 25 MG/ML
12.5 INJECTION INTRAMUSCULAR; INTRAVENOUS; SUBCUTANEOUS
Status: DISCONTINUED | OUTPATIENT
Start: 2022-11-30 | End: 2022-11-30 | Stop reason: HOSPADM

## 2022-11-30 RX ORDER — OXYCODONE HYDROCHLORIDE 5 MG/1
5 TABLET ORAL
Status: DISCONTINUED | OUTPATIENT
Start: 2022-11-30 | End: 2022-12-02 | Stop reason: HOSPADM

## 2022-11-30 RX ORDER — ONDANSETRON 2 MG/ML
INJECTION INTRAMUSCULAR; INTRAVENOUS PRN
Status: DISCONTINUED | OUTPATIENT
Start: 2022-11-30 | End: 2022-11-30 | Stop reason: SURG

## 2022-11-30 RX ORDER — OXYCODONE HCL 10 MG/1
10 TABLET, FILM COATED, EXTENDED RELEASE ORAL ONCE
Status: COMPLETED | OUTPATIENT
Start: 2022-11-30 | End: 2022-11-30

## 2022-11-30 RX ORDER — ACETAMINOPHEN 500 MG
1000 TABLET ORAL EVERY 6 HOURS
Status: DISCONTINUED | OUTPATIENT
Start: 2022-11-30 | End: 2022-12-02 | Stop reason: HOSPADM

## 2022-11-30 RX ORDER — ROCURONIUM BROMIDE 10 MG/ML
INJECTION, SOLUTION INTRAVENOUS PRN
Status: DISCONTINUED | OUTPATIENT
Start: 2022-11-30 | End: 2022-11-30 | Stop reason: SURG

## 2022-11-30 RX ORDER — AMOXICILLIN 250 MG
1 CAPSULE ORAL
Status: DISCONTINUED | OUTPATIENT
Start: 2022-11-30 | End: 2022-12-02 | Stop reason: HOSPADM

## 2022-11-30 RX ORDER — PROPAFENONE HYDROCHLORIDE 150 MG/1
150 TABLET, COATED ORAL EVERY 8 HOURS
Status: DISCONTINUED | OUTPATIENT
Start: 2022-11-30 | End: 2022-12-02 | Stop reason: HOSPADM

## 2022-11-30 RX ORDER — OXYCODONE HCL 5 MG/5 ML
10 SOLUTION, ORAL ORAL
Status: COMPLETED | OUTPATIENT
Start: 2022-11-30 | End: 2022-11-30

## 2022-11-30 RX ORDER — HYDRALAZINE HYDROCHLORIDE 20 MG/ML
10 INJECTION INTRAMUSCULAR; INTRAVENOUS
Status: DISCONTINUED | OUTPATIENT
Start: 2022-11-30 | End: 2022-12-02 | Stop reason: HOSPADM

## 2022-11-30 RX ORDER — ENEMA 19; 7 G/133ML; G/133ML
1 ENEMA RECTAL
Status: DISCONTINUED | OUTPATIENT
Start: 2022-11-30 | End: 2022-12-02 | Stop reason: HOSPADM

## 2022-11-30 RX ORDER — BISACODYL 10 MG
10 SUPPOSITORY, RECTAL RECTAL
Status: DISCONTINUED | OUTPATIENT
Start: 2022-11-30 | End: 2022-12-02 | Stop reason: HOSPADM

## 2022-11-30 RX ORDER — MIDAZOLAM HYDROCHLORIDE 1 MG/ML
INJECTION INTRAMUSCULAR; INTRAVENOUS PRN
Status: DISCONTINUED | OUTPATIENT
Start: 2022-11-30 | End: 2022-11-30 | Stop reason: SURG

## 2022-11-30 RX ORDER — CEFAZOLIN SODIUM 1 G/3ML
INJECTION, POWDER, FOR SOLUTION INTRAMUSCULAR; INTRAVENOUS PRN
Status: DISCONTINUED | OUTPATIENT
Start: 2022-11-30 | End: 2022-11-30 | Stop reason: SURG

## 2022-11-30 RX ORDER — GABAPENTIN 300 MG/1
600 CAPSULE ORAL ONCE
Status: COMPLETED | OUTPATIENT
Start: 2022-11-30 | End: 2022-11-30

## 2022-11-30 RX ORDER — SODIUM CHLORIDE, SODIUM LACTATE, POTASSIUM CHLORIDE, CALCIUM CHLORIDE 600; 310; 30; 20 MG/100ML; MG/100ML; MG/100ML; MG/100ML
INJECTION, SOLUTION INTRAVENOUS CONTINUOUS
Status: DISCONTINUED | OUTPATIENT
Start: 2022-11-30 | End: 2022-11-30 | Stop reason: HOSPADM

## 2022-11-30 RX ORDER — HALOPERIDOL 5 MG/ML
1 INJECTION INTRAMUSCULAR
Status: DISCONTINUED | OUTPATIENT
Start: 2022-11-30 | End: 2022-11-30 | Stop reason: HOSPADM

## 2022-11-30 RX ORDER — ACETAMINOPHEN 500 MG
1000 TABLET ORAL ONCE
Status: COMPLETED | OUTPATIENT
Start: 2022-11-30 | End: 2022-11-30

## 2022-11-30 RX ORDER — HYDROMORPHONE HYDROCHLORIDE 1 MG/ML
0.1 INJECTION, SOLUTION INTRAMUSCULAR; INTRAVENOUS; SUBCUTANEOUS
Status: DISCONTINUED | OUTPATIENT
Start: 2022-11-30 | End: 2022-11-30 | Stop reason: HOSPADM

## 2022-11-30 RX ORDER — DEXAMETHASONE SODIUM PHOSPHATE 4 MG/ML
INJECTION, SOLUTION INTRA-ARTICULAR; INTRALESIONAL; INTRAMUSCULAR; INTRAVENOUS; SOFT TISSUE PRN
Status: DISCONTINUED | OUTPATIENT
Start: 2022-11-30 | End: 2022-11-30 | Stop reason: SURG

## 2022-11-30 RX ORDER — OXYCODONE HYDROCHLORIDE 5 MG/1
2.5 TABLET ORAL
Status: DISCONTINUED | OUTPATIENT
Start: 2022-11-30 | End: 2022-12-02 | Stop reason: HOSPADM

## 2022-11-30 RX ORDER — ACETAMINOPHEN 500 MG
1000 TABLET ORAL EVERY 6 HOURS PRN
Status: DISCONTINUED | OUTPATIENT
Start: 2022-12-05 | End: 2022-12-02 | Stop reason: HOSPADM

## 2022-11-30 RX ORDER — DOCUSATE SODIUM 100 MG/1
100 CAPSULE, LIQUID FILLED ORAL 2 TIMES DAILY
Status: DISCONTINUED | OUTPATIENT
Start: 2022-11-30 | End: 2022-12-02 | Stop reason: HOSPADM

## 2022-11-30 RX ORDER — KETAMINE HYDROCHLORIDE 100 MG/ML
INJECTION, SOLUTION INTRAMUSCULAR; INTRAVENOUS PRN
Status: DISCONTINUED | OUTPATIENT
Start: 2022-11-30 | End: 2022-11-30 | Stop reason: SURG

## 2022-11-30 RX ORDER — METOPROLOL SUCCINATE 25 MG/1
25 TABLET, EXTENDED RELEASE ORAL DAILY
Status: DISCONTINUED | OUTPATIENT
Start: 2022-11-30 | End: 2022-12-02 | Stop reason: HOSPADM

## 2022-11-30 RX ORDER — LABETALOL HYDROCHLORIDE 5 MG/ML
10 INJECTION, SOLUTION INTRAVENOUS
Status: DISCONTINUED | OUTPATIENT
Start: 2022-11-30 | End: 2022-12-02 | Stop reason: HOSPADM

## 2022-11-30 RX ORDER — AMOXICILLIN 250 MG
1 CAPSULE ORAL NIGHTLY
Status: DISCONTINUED | OUTPATIENT
Start: 2022-11-30 | End: 2022-12-02 | Stop reason: HOSPADM

## 2022-11-30 RX ORDER — DIPHENHYDRAMINE HYDROCHLORIDE 50 MG/ML
12.5 INJECTION INTRAMUSCULAR; INTRAVENOUS
Status: DISCONTINUED | OUTPATIENT
Start: 2022-11-30 | End: 2022-11-30 | Stop reason: HOSPADM

## 2022-11-30 RX ORDER — HYDROMORPHONE HYDROCHLORIDE 2 MG/ML
INJECTION, SOLUTION INTRAMUSCULAR; INTRAVENOUS; SUBCUTANEOUS PRN
Status: DISCONTINUED | OUTPATIENT
Start: 2022-11-30 | End: 2022-11-30 | Stop reason: SURG

## 2022-11-30 RX ORDER — HYDROMORPHONE HYDROCHLORIDE 1 MG/ML
0.25 INJECTION, SOLUTION INTRAMUSCULAR; INTRAVENOUS; SUBCUTANEOUS
Status: DISCONTINUED | OUTPATIENT
Start: 2022-11-30 | End: 2022-12-02 | Stop reason: HOSPADM

## 2022-11-30 RX ORDER — HYDROMORPHONE HYDROCHLORIDE 1 MG/ML
0.2 INJECTION, SOLUTION INTRAMUSCULAR; INTRAVENOUS; SUBCUTANEOUS
Status: DISCONTINUED | OUTPATIENT
Start: 2022-11-30 | End: 2022-11-30 | Stop reason: HOSPADM

## 2022-11-30 RX ORDER — METOPROLOL TARTRATE 1 MG/ML
INJECTION, SOLUTION INTRAVENOUS PRN
Status: DISCONTINUED | OUTPATIENT
Start: 2022-11-30 | End: 2022-11-30 | Stop reason: SURG

## 2022-11-30 RX ORDER — HYDROMORPHONE HYDROCHLORIDE 1 MG/ML
0.4 INJECTION, SOLUTION INTRAMUSCULAR; INTRAVENOUS; SUBCUTANEOUS
Status: DISCONTINUED | OUTPATIENT
Start: 2022-11-30 | End: 2022-11-30 | Stop reason: HOSPADM

## 2022-11-30 RX ORDER — HYDRALAZINE HYDROCHLORIDE 20 MG/ML
5 INJECTION INTRAMUSCULAR; INTRAVENOUS
Status: DISCONTINUED | OUTPATIENT
Start: 2022-11-30 | End: 2022-11-30 | Stop reason: HOSPADM

## 2022-11-30 RX ORDER — OXYCODONE HCL 5 MG/5 ML
5 SOLUTION, ORAL ORAL
Status: COMPLETED | OUTPATIENT
Start: 2022-11-30 | End: 2022-11-30

## 2022-11-30 RX ORDER — CALCIUM CARBONATE 500 MG/1
500 TABLET, CHEWABLE ORAL 2 TIMES DAILY
Status: DISCONTINUED | OUTPATIENT
Start: 2022-11-30 | End: 2022-12-02 | Stop reason: HOSPADM

## 2022-11-30 RX ORDER — METOPROLOL SUCCINATE 25 MG/1
25 TABLET, EXTENDED RELEASE ORAL DAILY
COMMUNITY
End: 2023-03-28 | Stop reason: SDUPTHER

## 2022-11-30 RX ORDER — POLYETHYLENE GLYCOL 3350 17 G/17G
1 POWDER, FOR SOLUTION ORAL 2 TIMES DAILY PRN
Status: DISCONTINUED | OUTPATIENT
Start: 2022-11-30 | End: 2022-12-02 | Stop reason: HOSPADM

## 2022-11-30 RX ORDER — SODIUM CHLORIDE, SODIUM LACTATE, POTASSIUM CHLORIDE, CALCIUM CHLORIDE 600; 310; 30; 20 MG/100ML; MG/100ML; MG/100ML; MG/100ML
INJECTION, SOLUTION INTRAVENOUS CONTINUOUS
Status: ACTIVE | OUTPATIENT
Start: 2022-11-30 | End: 2022-11-30

## 2022-11-30 RX ORDER — IPRATROPIUM BROMIDE AND ALBUTEROL SULFATE 2.5; .5 MG/3ML; MG/3ML
3 SOLUTION RESPIRATORY (INHALATION)
Status: DISCONTINUED | OUTPATIENT
Start: 2022-11-30 | End: 2022-11-30 | Stop reason: HOSPADM

## 2022-11-30 RX ORDER — LABETALOL HYDROCHLORIDE 5 MG/ML
5 INJECTION, SOLUTION INTRAVENOUS
Status: DISCONTINUED | OUTPATIENT
Start: 2022-11-30 | End: 2022-11-30 | Stop reason: HOSPADM

## 2022-11-30 RX ORDER — VASOPRESSIN 20 U/ML
INJECTION PARENTERAL PRN
Status: DISCONTINUED | OUTPATIENT
Start: 2022-11-30 | End: 2022-11-30 | Stop reason: SURG

## 2022-11-30 RX ORDER — TEMAZEPAM 7.5 MG/1
15 CAPSULE ORAL
Status: DISCONTINUED | OUTPATIENT
Start: 2022-12-01 | End: 2022-12-02 | Stop reason: HOSPADM

## 2022-11-30 RX ORDER — SODIUM CHLORIDE 9 MG/ML
INJECTION, SOLUTION INTRAVENOUS CONTINUOUS
Status: DISCONTINUED | OUTPATIENT
Start: 2022-11-30 | End: 2022-12-02 | Stop reason: HOSPADM

## 2022-11-30 RX ORDER — LIDOCAINE HYDROCHLORIDE 20 MG/ML
INJECTION, SOLUTION EPIDURAL; INFILTRATION; INTRACAUDAL; PERINEURAL PRN
Status: DISCONTINUED | OUTPATIENT
Start: 2022-11-30 | End: 2022-11-30 | Stop reason: SURG

## 2022-11-30 RX ORDER — METHOCARBAMOL 750 MG/1
750 TABLET, FILM COATED ORAL EVERY 8 HOURS PRN
Status: DISCONTINUED | OUTPATIENT
Start: 2022-11-30 | End: 2022-12-02 | Stop reason: HOSPADM

## 2022-11-30 RX ADMIN — VASOPRESSIN 1 UNITS: 20 INJECTION INTRAVENOUS at 08:01

## 2022-11-30 RX ADMIN — FENTANYL CITRATE 25 MCG: 50 INJECTION, SOLUTION INTRAMUSCULAR; INTRAVENOUS at 10:05

## 2022-11-30 RX ADMIN — KETAMINE HYDROCHLORIDE 30 MG: 100 INJECTION INTRAMUSCULAR; INTRAVENOUS at 07:08

## 2022-11-30 RX ADMIN — DOCUSATE SODIUM 100 MG: 100 CAPSULE, LIQUID FILLED ORAL at 17:32

## 2022-11-30 RX ADMIN — PROPOFOL 100 MCG/KG/MIN: 10 INJECTION, EMULSION INTRAVENOUS at 07:10

## 2022-11-30 RX ADMIN — LIDOCAINE HYDROCHLORIDE 0.5 ML: 10 INJECTION, SOLUTION EPIDURAL; INFILTRATION; INTRACAUDAL; PERINEURAL at 05:59

## 2022-11-30 RX ADMIN — SODIUM CHLORIDE, POTASSIUM CHLORIDE, SODIUM LACTATE AND CALCIUM CHLORIDE: 600; 310; 30; 20 INJECTION, SOLUTION INTRAVENOUS at 06:11

## 2022-11-30 RX ADMIN — METOPROLOL TARTRATE 5 MG: 5 INJECTION, SOLUTION INTRAVENOUS at 07:31

## 2022-11-30 RX ADMIN — CALCIUM CARBONATE 500 MG: 500 TABLET, CHEWABLE ORAL at 17:32

## 2022-11-30 RX ADMIN — METHOCARBAMOL 1000 MG: 100 INJECTION INTRAMUSCULAR; INTRAVENOUS at 10:03

## 2022-11-30 RX ADMIN — HYDROMORPHONE HYDROCHLORIDE 1 MG: 2 INJECTION INTRAMUSCULAR; INTRAVENOUS; SUBCUTANEOUS at 07:06

## 2022-11-30 RX ADMIN — CEFAZOLIN 2 G: 330 INJECTION, POWDER, FOR SOLUTION INTRAMUSCULAR; INTRAVENOUS at 07:09

## 2022-11-30 RX ADMIN — ACETAMINOPHEN 1000 MG: 500 TABLET ORAL at 06:44

## 2022-11-30 RX ADMIN — ROCURONIUM BROMIDE 50 MG: 10 INJECTION, SOLUTION INTRAVENOUS at 07:09

## 2022-11-30 RX ADMIN — ACETAMINOPHEN 1000 MG: 500 TABLET ORAL at 23:25

## 2022-11-30 RX ADMIN — LIDOCAINE HYDROCHLORIDE 100 MG: 20 INJECTION, SOLUTION EPIDURAL; INFILTRATION; INTRACAUDAL at 07:09

## 2022-11-30 RX ADMIN — MIDAZOLAM HYDROCHLORIDE 2 MG: 1 INJECTION, SOLUTION INTRAMUSCULAR; INTRAVENOUS at 07:06

## 2022-11-30 RX ADMIN — GABAPENTIN 600 MG: 300 CAPSULE ORAL at 06:44

## 2022-11-30 RX ADMIN — PROPOFOL 200 MG: 10 INJECTION, EMULSION INTRAVENOUS at 07:09

## 2022-11-30 RX ADMIN — OXYCODONE 2.5 MG: 5 TABLET ORAL at 21:14

## 2022-11-30 RX ADMIN — ONDANSETRON 4 MG: 2 INJECTION INTRAMUSCULAR; INTRAVENOUS at 08:45

## 2022-11-30 RX ADMIN — HYDROMORPHONE HYDROCHLORIDE 1 MG: 2 INJECTION INTRAMUSCULAR; INTRAVENOUS; SUBCUTANEOUS at 07:30

## 2022-11-30 RX ADMIN — PROPOFOL 50 MG: 10 INJECTION, EMULSION INTRAVENOUS at 07:31

## 2022-11-30 RX ADMIN — ACETAMINOPHEN 1000 MG: 500 TABLET ORAL at 12:46

## 2022-11-30 RX ADMIN — CHOLECALCIFEROL TAB 125 MCG (5000 UNIT) 5000 UNITS: 125 TAB at 12:47

## 2022-11-30 RX ADMIN — SODIUM CHLORIDE: 9 INJECTION, SOLUTION INTRAVENOUS at 12:51

## 2022-11-30 RX ADMIN — OXYCODONE 5 MG: 5 TABLET ORAL at 17:32

## 2022-11-30 RX ADMIN — ACETAMINOPHEN 1000 MG: 500 TABLET ORAL at 21:03

## 2022-11-30 RX ADMIN — PROPAFENONE HYDROCHLORIDE 150 MG: 150 TABLET, FILM COATED ORAL at 21:24

## 2022-11-30 RX ADMIN — CEFAZOLIN 2 G: 2 INJECTION, POWDER, FOR SOLUTION INTRAMUSCULAR; INTRAVENOUS at 23:24

## 2022-11-30 RX ADMIN — METHOCARBAMOL 1000 MG: 100 INJECTION INTRAMUSCULAR; INTRAVENOUS at 17:36

## 2022-11-30 RX ADMIN — CEFAZOLIN 2 G: 2 INJECTION, POWDER, FOR SOLUTION INTRAMUSCULAR; INTRAVENOUS at 14:33

## 2022-11-30 RX ADMIN — DEXAMETHASONE SODIUM PHOSPHATE 8 MG: 4 INJECTION, SOLUTION INTRA-ARTICULAR; INTRALESIONAL; INTRAMUSCULAR; INTRAVENOUS; SOFT TISSUE at 07:09

## 2022-11-30 RX ADMIN — FENTANYL CITRATE 25 MCG: 50 INJECTION, SOLUTION INTRAMUSCULAR; INTRAVENOUS at 09:45

## 2022-11-30 RX ADMIN — OXYCODONE HYDROCHLORIDE 5 MG: 5 SOLUTION ORAL at 09:43

## 2022-11-30 RX ADMIN — OXYCODONE 5 MG: 5 TABLET ORAL at 12:46

## 2022-11-30 RX ADMIN — SENNOSIDES AND DOCUSATE SODIUM 1 TABLET: 50; 8.6 TABLET ORAL at 21:03

## 2022-11-30 RX ADMIN — OXYCODONE HYDROCHLORIDE 10 MG: 10 TABLET, FILM COATED, EXTENDED RELEASE ORAL at 06:44

## 2022-11-30 RX ADMIN — METOPROLOL SUCCINATE 25 MG: 25 TABLET, EXTENDED RELEASE ORAL at 12:46

## 2022-11-30 RX ADMIN — PROPAFENONE HYDROCHLORIDE 150 MG: 150 TABLET, FILM COATED ORAL at 14:32

## 2022-11-30 ASSESSMENT — PAIN DESCRIPTION - PAIN TYPE
TYPE: SURGICAL PAIN;CHRONIC PAIN
TYPE: CHRONIC PAIN;SURGICAL PAIN
TYPE: SURGICAL PAIN
TYPE: SURGICAL PAIN;CHRONIC PAIN
TYPE: SURGICAL PAIN
TYPE: SURGICAL PAIN;CHRONIC PAIN
TYPE: CHRONIC PAIN
TYPE: SURGICAL PAIN;CHRONIC PAIN

## 2022-11-30 ASSESSMENT — FIBROSIS 4 INDEX: FIB4 SCORE: 0.88

## 2022-11-30 ASSESSMENT — PAIN SCALES - GENERAL: PAIN_LEVEL: 4

## 2022-11-30 NOTE — OR NURSING
Patient arrived to PACU via gurney at 0856 with siderails up x 2, brake locked upon arrival. Oral airway in place. Received report from Nel LACKEY and Dr. Arshad, assumed care of patient. Lower abdominal incision with steri strips, 4 x 4 gauze and Medipore tape, dressing CDI. Bilateral pedal pulses +3. Ice pack applied to surgical site. Patient rouses to voice at 0926, oral airway removed. Neuro intact, patient able to wiggle toes and move BLE spontaneously. Dorsal flexion and plantar flexion strong. Oriented x 4 at 0930. Patient tolerating sips of water at 0935. Reports 5/10 pain at 0940, Fentanyl and Oxycodone administered per MAR. Glasses placed on patient. Robaxin administered per order at 1003. Patient resting comfortably at 1015, reports pain improved and tolerable. Vital signs stable, abdominal dressing remains CDI.  Awaiting room on floor. Report to Lamar BYRD RN at 1115.

## 2022-11-30 NOTE — ANESTHESIA POSTPROCEDURE EVALUATION
Patient: Chung Griffiths    Procedure Summary     Date: 11/30/22 Room / Location: [facility] 06 / SURGERY [facility]    Anesthesia Start: 0703 Anesthesia Stop: 0900    Procedure: STAGE#1 ANTERIOR LUMBAR 5-SACRAL 1 INTERBODY FUSION (Abdomen) Diagnosis: (SPONDYLOLISTHESIS LUMBAR REGION, LUMBAR DISC DEGENERATION)    Surgeons: Jeremy Treviño M.D. Responsible Provider: Kirill Arshad M.D.    Anesthesia Type: general ASA Status: 2          Final Anesthesia Type: general  Last vitals  BP   Blood Pressure: 125/79    Temp   36.1 °C (96.9 °F)    Pulse   62   Resp   16    SpO2   94 %      Anesthesia Post Evaluation    Patient location during evaluation: PACU  Patient participation: complete - patient participated  Level of consciousness: sleepy but conscious  Pain score: 4    Airway patency: patent  Anesthetic complications: no  Cardiovascular status: hemodynamically stable  Respiratory status: acceptable  Hydration status: balanced    PONV: none          There were no known notable events for this encounter.     Nurse Pain Score: 7 (NPRS)

## 2022-11-30 NOTE — ANESTHESIA PREPROCEDURE EVALUATION
Case: [id number] Date/Time: 11/30/22 0645    Procedure: STAGE#1 ANTERIOR LUMBAR 5-SACRAL 1 INTERBODY FUSION    Pre-op diagnosis: SPONDYLOLISTHESIS LUMBAR REGION, LUMBAR DISC DEGENERATION    Location: [facility] OR 06 / SURGERY [facility]    Surgeons: Jeremy Treviño M.D.          Relevant Problems   ANESTHESIA   (positive) ENRICO on CPAP      CARDIAC   (positive) Arrhythmia   (positive) SVT (supraventricular tachycardia) (HCC)      Other   (positive) Dyslipidemia   (positive) High risk medication use - antiarrhythmic   (positive) Hypertriglyceridemia   (positive) Nonspecific abnormal electrocardiogram (ECG) (EKG)     S/p SVT ablation 2021    TTE 12/2021:  CONCLUSIONS  No prior study is available for comparison.   The left ventricular ejection fraction is visually estimated to be 60%.  Estimated right ventricular systolic pressure is 30 mmHg.    Physical Exam    Airway   Mallampati: III  TM distance: >3 FB  Neck ROM: full       Cardiovascular - normal exam  Rhythm: regular  Rate: normal  (-) murmur     Dental - normal exam           Pulmonary - normal exam  Breath sounds clear to auscultation     Abdominal    Neurological - normal exam                 Anesthesia Plan    ASA 2       Plan - general       Airway plan will be ETT          Induction: intravenous    Postoperative Plan: Postoperative administration of opioids is intended.    Pertinent diagnostic labs and testing reviewed    Informed Consent:    Anesthetic plan and risks discussed with patient.    Use of blood products discussed with: patient whom consented to blood products.

## 2022-11-30 NOTE — OR SURGEON
Immediate Post OP Note    PreOp Diagnosis: Severe lumbar DDD with lumbar stenosis      PostOp Diagnosis: Same      Procedure(s):  STAGE#1 ANTERIOR LUMBAR 5-SACRAL 1 INTERBODY FUSION - Wound Class: Clean    Surgeon(s):  BRENDAN Tovar M.D.    Anesthesiologist/Type of Anesthesia:  Anesthesiologist: Kirill Arshad M.D./General    Surgical Staff:  Assistant: Radha Loaiza P.A.-C.  Cell Saver : Dainne West  Circulator: Carlitos Goldsmith R.N.; Nel De La Rosa R.N.  Relief Scrub: Naveed Pollock  Scrub Person: Lindsey Enriquez  Radiology Technologist: Alexus Pham    Specimens removed if any:  * No specimens in log *    Estimated Blood Loss: 50mls    Findings: Severe stenosis     Complications: None        11/30/2022 8:57 AM Radha Loaiza P.A.-C.

## 2022-11-30 NOTE — PROGRESS NOTES
2 RN skin check done w/ZIYAD Lopez.   Sx incision to abdomen. Dressing intact w/small sanguineous drainage. Scab to left posterior shoulder.   No other skin breakdown noted.

## 2022-11-30 NOTE — ANESTHESIA PROCEDURE NOTES
Airway    Date/Time: 11/30/2022 7:10 AM  Performed by: Kirill Arshad M.D.  Authorized by: Kirill Arshad M.D.     Location:  OR  Urgency:  Elective  Indications for Airway Management:  Anesthesia      Spontaneous Ventilation: absent    Sedation Level:  Deep  Preoxygenated: Yes    Patient Position:  Sniffing  Final Airway Type:  Endotracheal airway  Final Endotracheal Airway:  ETT  Cuffed: Yes    Technique Used for Successful ETT Placement:  Video laryngoscopy    Insertion Site:  Oral  Blade Type:  Glide  Laryngoscope Blade/Videolaryngoscope Blade Size:  4  ETT Size (mm):  8.0  Measured from:  Teeth  ETT to Teeth (cm):  23  Placement Verified by: auscultation and capnometry    Cormack-Lehane Classification:  Grade I - full view of glottis  Number of Attempts at Approach:  1

## 2022-11-30 NOTE — CONSULTS
DATE OF SERVICE:  11/30/2022     SURGICAL CONSULTATION     TIME:  0630 hours     CHIEF COMPLAINT:  Consult for exposure for spine fusion.     HISTORY OF PRESENT ILLNESS:  This 57-year-old male seen at the request of Dr. Jeremy Treviño.     Over the last 6-12 months, this gentleman has had worsening low back pain with   bilateral lower extremity radiculopathy.  He denies falling or inability to    his toes.     He has had epidural injections without relief and at this time is seen now for   first stage, which is an anterior lumbar interbody fusion at the L5-S1 level.     PAST MEDICAL HISTORY:     OPERATIONS:  Knee arthroscopy, cardiac ablation.     MEDICAL DISEASES:  Chronic pain.     MEDICATIONS:  Flecainide, metoprolol, Rythmol, propafenone, tramadol.     ALLERGIES:  None.     FAMILY HISTORY:  Positive for heart disease.     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     REVIEW OF SYSTEMS:  I reviewed the 10-point AMA/CMS criteria from the chart   and the patient has no other positive findings.     PHYSICAL EXAMINATION:    VITAL SIGNS:  Temperature 98.8, blood pressure 127/82, pulse 90.  HEENT:  Eyeglasses in place.  NECK:  Without masses.  CHEST:  Coarse breath sounds.  CARDIAC:  Auscultation unremarkable.  ABDOMEN:  Pendulous, no hepatosplenomegaly.  RECTAL/GENITAL:  Deferred.  EXTREMITIES:  2+ pulses, trace edema.     IMPRESSION:   1.  Multilevel degenerative lumbosacral disk disease with low back pain and   radiculopathy.  2.  Knee arthroscopy.  3.  Cardiac ablation.  4.  Hypertension.  5.  Chronic pain syndrome.     PLAN:  The patient will have an anterior left iliac retroperitoneal approach   for his surgery.     Risks of death, bleeding, infection, injury to the ureter, injury to great   vessels, postop wound complications, possibly aborting the procedure because   of poor exposure have been carefully explained to this gentleman as well as   his wife.  They understand and  wish to proceed.     Thank you very much for the consultation.        ______________________________  MD MAYI SANTA/MAIA    DD:  11/30/2022 06:36  DT:  11/30/2022 07:09    Job#:  [id number]

## 2022-11-30 NOTE — OP REPORT
DATE OF SERVICE:  11/30/2022     OPERATING SURGEON:  Be Perez MD     CO-SURGEON:  Jeremy Treviño MD     PROCEDURE:  Left iliac retroperitoneal approach for L5-S1 diskectomy and cage   fusion.     ANESTHESIA:  General.     ESTIMATED BLOOD LOSS:  Less than 50 mL.     PREOPERATIVE DIAGNOSES:  Multilevel degenerative.  lumbosacral disk disease with low back pain and bilateral lower extremity   radiculopathy.     POSTOPERATIVE DIAGNOSES:  Multilevel degenerative.  lumbosacral disk disease with low back pain and bilateral lower extremity   radiculopathy.     SUMMARY:  A 57-year-old male with a BMI of 32 and has low back pain and   bilateral lower extremity radiculopathy, which has developed over the last   6-12 months, he has evidence for significant collapse at L5-S1.  He will   undergo an anterior lumbar interbody fusion at the aforementioned level and   subsequent posterior decompression and fusion.  The risks have been explained   to the patient and family.     DESCRIPTION OF PROCEDURE:  The patient was taken to the operating room and   satisfactory general anesthesia was induced by endotracheal intubation.  The   patient was prepped and draped.  Based on the x-rays, left iliac incision was   made and carried down through the thick pannus with exposure of the left   rectus sheath.  This was scored and underlying rectus muscle freed up. The   left rectus muscle was retracted laterally and the retroperitoneal space   entered.  Retroperitoneal contents including left ureter brought to the   patient's right and self-retaining retractors were placed in the depths of the   wound with difficulty, owing to his truncal obesity.     The sacral promontory was skeletonized with bipolar cautery and the   appropriate level identified in AP and lateral projections.     Please see dictation of Dr. Jeremy Treviño for the diskectomy and cage fusion.     X-rays were negative for retained foreign bodies and the patient was  closed   with interrupted #1 Vicryl sutures on the fascia, running 4-0 Vicryl   subcuticular skin stitches and Steri-Strips.  Wound was dressed.  The patient   sent to recovery room in good condition.  No specimens were sent to pathology.        ______________________________  GREGG COURTNEY MD    TER/NIT    DD:  11/30/2022 08:51  DT:  11/30/2022 09:11    Job#:  [id number]    CC:Jeremy Treviño MD

## 2022-12-01 ENCOUNTER — ANESTHESIA EVENT (OUTPATIENT)
Dept: SURGERY | Facility: MEDICAL CENTER | Age: 57
DRG: 455 | End: 2022-12-01
Payer: COMMERCIAL

## 2022-12-01 ENCOUNTER — APPOINTMENT (OUTPATIENT)
Dept: RADIOLOGY | Facility: MEDICAL CENTER | Age: 57
DRG: 455 | End: 2022-12-01
Attending: NEUROLOGICAL SURGERY
Payer: COMMERCIAL

## 2022-12-01 ENCOUNTER — ANESTHESIA (OUTPATIENT)
Dept: SURGERY | Facility: MEDICAL CENTER | Age: 57
DRG: 455 | End: 2022-12-01
Payer: COMMERCIAL

## 2022-12-01 ENCOUNTER — APPOINTMENT (OUTPATIENT)
Dept: RADIOLOGY | Facility: MEDICAL CENTER | Age: 57
DRG: 455 | End: 2022-12-01
Attending: STUDENT IN AN ORGANIZED HEALTH CARE EDUCATION/TRAINING PROGRAM
Payer: COMMERCIAL

## 2022-12-01 LAB
ANION GAP SERPL CALC-SCNC: 13 MMOL/L (ref 7–16)
BUN SERPL-MCNC: 17 MG/DL (ref 8–22)
CALCIUM SERPL-MCNC: 9.4 MG/DL (ref 8.5–10.5)
CHLORIDE SERPL-SCNC: 105 MMOL/L (ref 96–112)
CO2 SERPL-SCNC: 21 MMOL/L (ref 20–33)
CREAT SERPL-MCNC: 0.77 MG/DL (ref 0.5–1.4)
ERYTHROCYTE [DISTWIDTH] IN BLOOD BY AUTOMATED COUNT: 41.3 FL (ref 35.9–50)
GFR SERPLBLD CREATININE-BSD FMLA CKD-EPI: 103 ML/MIN/1.73 M 2
GLUCOSE SERPL-MCNC: 173 MG/DL (ref 65–99)
HCT VFR BLD AUTO: 36.4 % (ref 42–52)
HGB BLD-MCNC: 12.7 G/DL (ref 14–18)
MCH RBC QN AUTO: 31.1 PG (ref 27–33)
MCHC RBC AUTO-ENTMCNC: 34.9 G/DL (ref 33.7–35.3)
MCV RBC AUTO: 89 FL (ref 81.4–97.8)
PLATELET # BLD AUTO: 249 K/UL (ref 164–446)
PMV BLD AUTO: 9.4 FL (ref 9–12.9)
POTASSIUM SERPL-SCNC: 4 MMOL/L (ref 3.6–5.5)
RBC # BLD AUTO: 4.09 M/UL (ref 4.7–6.1)
SODIUM SERPL-SCNC: 139 MMOL/L (ref 135–145)
WBC # BLD AUTO: 14.2 K/UL (ref 4.8–10.8)

## 2022-12-01 PROCEDURE — 160048 HCHG OR STATISTICAL LEVEL 1-5: Performed by: NEUROLOGICAL SURGERY

## 2022-12-01 PROCEDURE — 700105 HCHG RX REV CODE 258: Performed by: PHYSICIAN ASSISTANT

## 2022-12-01 PROCEDURE — 110454 HCHG SHELL REV 250: Performed by: NEUROLOGICAL SURGERY

## 2022-12-01 PROCEDURE — 160036 HCHG PACU - EA ADDL 30 MINS PHASE I: Performed by: NEUROLOGICAL SURGERY

## 2022-12-01 PROCEDURE — 80048 BASIC METABOLIC PNL TOTAL CA: CPT

## 2022-12-01 PROCEDURE — 700111 HCHG RX REV CODE 636 W/ 250 OVERRIDE (IP): Performed by: NEUROLOGICAL SURGERY

## 2022-12-01 PROCEDURE — 700102 HCHG RX REV CODE 250 W/ 637 OVERRIDE(OP): Performed by: PHYSICIAN ASSISTANT

## 2022-12-01 PROCEDURE — 160035 HCHG PACU - 1ST 60 MINS PHASE I: Performed by: NEUROLOGICAL SURGERY

## 2022-12-01 PROCEDURE — 0SG33K1 FUSION OF LUMBOSACRAL JOINT WITH NONAUTOLOGOUS TISSUE SUBSTITUTE, POSTERIOR APPROACH, POSTERIOR COLUMN, PERCUTANEOUS APPROACH: ICD-10-PCS | Performed by: NEUROLOGICAL SURGERY

## 2022-12-01 PROCEDURE — 700101 HCHG RX REV CODE 250: Performed by: NEUROLOGICAL SURGERY

## 2022-12-01 PROCEDURE — 95937 NEUROMUSCULAR JUNCTION TEST: CPT | Performed by: NEUROLOGICAL SURGERY

## 2022-12-01 PROCEDURE — 160002 HCHG RECOVERY MINUTES (STAT): Performed by: NEUROLOGICAL SURGERY

## 2022-12-01 PROCEDURE — 770001 HCHG ROOM/CARE - MED/SURG/GYN PRIV*

## 2022-12-01 PROCEDURE — 502000 HCHG MISC OR IMPLANTS RC 0278: Performed by: NEUROLOGICAL SURGERY

## 2022-12-01 PROCEDURE — 85027 COMPLETE CBC AUTOMATED: CPT

## 2022-12-01 PROCEDURE — 160042 HCHG SURGERY MINUTES - EA ADDL 1 MIN LEVEL 5: Performed by: NEUROLOGICAL SURGERY

## 2022-12-01 PROCEDURE — 160031 HCHG SURGERY MINUTES - 1ST 30 MINS LEVEL 5: Performed by: NEUROLOGICAL SURGERY

## 2022-12-01 PROCEDURE — A9270 NON-COVERED ITEM OR SERVICE: HCPCS | Performed by: PHYSICIAN ASSISTANT

## 2022-12-01 PROCEDURE — 700111 HCHG RX REV CODE 636 W/ 250 OVERRIDE (IP): Performed by: ANESTHESIOLOGY

## 2022-12-01 PROCEDURE — 95861 NEEDLE EMG 2 EXTREMITIES: CPT | Performed by: NEUROLOGICAL SURGERY

## 2022-12-01 PROCEDURE — 72100 X-RAY EXAM L-S SPINE 2/3 VWS: CPT

## 2022-12-01 PROCEDURE — 700101 HCHG RX REV CODE 250: Performed by: ANESTHESIOLOGY

## 2022-12-01 PROCEDURE — 95940 IONM IN OPERATNG ROOM 15 MIN: CPT | Performed by: NEUROLOGICAL SURGERY

## 2022-12-01 PROCEDURE — 160009 HCHG ANES TIME/MIN: Performed by: NEUROLOGICAL SURGERY

## 2022-12-01 PROCEDURE — 110371 HCHG SHELL REV 272: Performed by: NEUROLOGICAL SURGERY

## 2022-12-01 PROCEDURE — 700102 HCHG RX REV CODE 250 W/ 637 OVERRIDE(OP): Performed by: ANESTHESIOLOGY

## 2022-12-01 PROCEDURE — 502240 HCHG MISC OR SUPPLY RC 0272: Performed by: NEUROLOGICAL SURGERY

## 2022-12-01 PROCEDURE — C1713 ANCHOR/SCREW BN/BN,TIS/BN: HCPCS | Performed by: NEUROLOGICAL SURGERY

## 2022-12-01 PROCEDURE — 95938 SOMATOSENSORY TESTING: CPT | Performed by: NEUROLOGICAL SURGERY

## 2022-12-01 PROCEDURE — 00630 ANES PX LUMBAR REGION NOS: CPT | Performed by: ANESTHESIOLOGY

## 2022-12-01 PROCEDURE — A9270 NON-COVERED ITEM OR SERVICE: HCPCS | Performed by: ANESTHESIOLOGY

## 2022-12-01 PROCEDURE — 700111 HCHG RX REV CODE 636 W/ 250 OVERRIDE (IP): Performed by: PHYSICIAN ASSISTANT

## 2022-12-01 RX ORDER — ONDANSETRON 2 MG/ML
4 INJECTION INTRAMUSCULAR; INTRAVENOUS
Status: DISCONTINUED | OUTPATIENT
Start: 2022-12-01 | End: 2022-12-01 | Stop reason: HOSPADM

## 2022-12-01 RX ORDER — HYDROMORPHONE HYDROCHLORIDE 2 MG/ML
INJECTION, SOLUTION INTRAMUSCULAR; INTRAVENOUS; SUBCUTANEOUS PRN
Status: DISCONTINUED | OUTPATIENT
Start: 2022-12-01 | End: 2022-12-01 | Stop reason: SURG

## 2022-12-01 RX ORDER — SODIUM CHLORIDE, SODIUM LACTATE, POTASSIUM CHLORIDE, CALCIUM CHLORIDE 600; 310; 30; 20 MG/100ML; MG/100ML; MG/100ML; MG/100ML
INJECTION, SOLUTION INTRAVENOUS CONTINUOUS
Status: DISCONTINUED | OUTPATIENT
Start: 2022-12-01 | End: 2022-12-01 | Stop reason: HOSPADM

## 2022-12-01 RX ORDER — HYDROMORPHONE HYDROCHLORIDE 1 MG/ML
0.4 INJECTION, SOLUTION INTRAMUSCULAR; INTRAVENOUS; SUBCUTANEOUS
Status: DISCONTINUED | OUTPATIENT
Start: 2022-12-01 | End: 2022-12-01 | Stop reason: HOSPADM

## 2022-12-01 RX ORDER — ENOXAPARIN SODIUM 100 MG/ML
40 INJECTION SUBCUTANEOUS
Status: DISCONTINUED | OUTPATIENT
Start: 2022-12-02 | End: 2022-12-02 | Stop reason: HOSPADM

## 2022-12-01 RX ORDER — ONDANSETRON 2 MG/ML
INJECTION INTRAMUSCULAR; INTRAVENOUS PRN
Status: DISCONTINUED | OUTPATIENT
Start: 2022-12-01 | End: 2022-12-01 | Stop reason: SURG

## 2022-12-01 RX ORDER — BUPIVACAINE HYDROCHLORIDE AND EPINEPHRINE 5; 5 MG/ML; UG/ML
INJECTION, SOLUTION PERINEURAL
Status: DISCONTINUED | OUTPATIENT
Start: 2022-12-01 | End: 2022-12-01 | Stop reason: HOSPADM

## 2022-12-01 RX ORDER — LIDOCAINE HYDROCHLORIDE 20 MG/ML
INJECTION, SOLUTION EPIDURAL; INFILTRATION; INTRACAUDAL; PERINEURAL PRN
Status: DISCONTINUED | OUTPATIENT
Start: 2022-12-01 | End: 2022-12-01 | Stop reason: SURG

## 2022-12-01 RX ORDER — HALOPERIDOL 5 MG/ML
1 INJECTION INTRAMUSCULAR
Status: DISCONTINUED | OUTPATIENT
Start: 2022-12-01 | End: 2022-12-01 | Stop reason: HOSPADM

## 2022-12-01 RX ORDER — HYDROMORPHONE HYDROCHLORIDE 1 MG/ML
0.2 INJECTION, SOLUTION INTRAMUSCULAR; INTRAVENOUS; SUBCUTANEOUS
Status: DISCONTINUED | OUTPATIENT
Start: 2022-12-01 | End: 2022-12-01 | Stop reason: HOSPADM

## 2022-12-01 RX ORDER — HYDROMORPHONE HYDROCHLORIDE 1 MG/ML
0.1 INJECTION, SOLUTION INTRAMUSCULAR; INTRAVENOUS; SUBCUTANEOUS
Status: DISCONTINUED | OUTPATIENT
Start: 2022-12-01 | End: 2022-12-01 | Stop reason: HOSPADM

## 2022-12-01 RX ORDER — ROCURONIUM BROMIDE 10 MG/ML
INJECTION, SOLUTION INTRAVENOUS PRN
Status: DISCONTINUED | OUTPATIENT
Start: 2022-12-01 | End: 2022-12-01 | Stop reason: SURG

## 2022-12-01 RX ORDER — MEPERIDINE HYDROCHLORIDE 25 MG/ML
12.5 INJECTION INTRAMUSCULAR; INTRAVENOUS; SUBCUTANEOUS
Status: DISCONTINUED | OUTPATIENT
Start: 2022-12-01 | End: 2022-12-01 | Stop reason: HOSPADM

## 2022-12-01 RX ORDER — HYDRALAZINE HYDROCHLORIDE 20 MG/ML
5 INJECTION INTRAMUSCULAR; INTRAVENOUS
Status: DISCONTINUED | OUTPATIENT
Start: 2022-12-01 | End: 2022-12-01 | Stop reason: HOSPADM

## 2022-12-01 RX ORDER — CEFAZOLIN SODIUM 1 G/3ML
INJECTION, POWDER, FOR SOLUTION INTRAMUSCULAR; INTRAVENOUS PRN
Status: DISCONTINUED | OUTPATIENT
Start: 2022-12-01 | End: 2022-12-01 | Stop reason: SURG

## 2022-12-01 RX ORDER — IPRATROPIUM BROMIDE AND ALBUTEROL SULFATE 2.5; .5 MG/3ML; MG/3ML
3 SOLUTION RESPIRATORY (INHALATION)
Status: DISCONTINUED | OUTPATIENT
Start: 2022-12-01 | End: 2022-12-01 | Stop reason: HOSPADM

## 2022-12-01 RX ORDER — DEXAMETHASONE SODIUM PHOSPHATE 4 MG/ML
INJECTION, SOLUTION INTRA-ARTICULAR; INTRALESIONAL; INTRAMUSCULAR; INTRAVENOUS; SOFT TISSUE PRN
Status: DISCONTINUED | OUTPATIENT
Start: 2022-12-01 | End: 2022-12-01 | Stop reason: SURG

## 2022-12-01 RX ORDER — OXYCODONE HCL 5 MG/5 ML
10 SOLUTION, ORAL ORAL
Status: COMPLETED | OUTPATIENT
Start: 2022-12-01 | End: 2022-12-01

## 2022-12-01 RX ORDER — ACETAMINOPHEN 500 MG
1000 TABLET ORAL ONCE
Status: COMPLETED | OUTPATIENT
Start: 2022-12-01 | End: 2022-12-01

## 2022-12-01 RX ORDER — CEFAZOLIN SODIUM 1 G/3ML
INJECTION, POWDER, FOR SOLUTION INTRAMUSCULAR; INTRAVENOUS
Status: DISCONTINUED | OUTPATIENT
Start: 2022-12-01 | End: 2022-12-01 | Stop reason: HOSPADM

## 2022-12-01 RX ORDER — OXYCODONE HCL 5 MG/5 ML
5 SOLUTION, ORAL ORAL
Status: COMPLETED | OUTPATIENT
Start: 2022-12-01 | End: 2022-12-01

## 2022-12-01 RX ADMIN — PROPOFOL 200 MG: 10 INJECTION, EMULSION INTRAVENOUS at 12:16

## 2022-12-01 RX ADMIN — ONDANSETRON 4 MG: 2 INJECTION INTRAMUSCULAR; INTRAVENOUS at 12:16

## 2022-12-01 RX ADMIN — OXYCODONE 2.5 MG: 5 TABLET ORAL at 19:57

## 2022-12-01 RX ADMIN — SUGAMMADEX 200 MG: 100 INJECTION, SOLUTION INTRAVENOUS at 12:59

## 2022-12-01 RX ADMIN — DEXAMETHASONE SODIUM PHOSPHATE 4 MG: 4 INJECTION, SOLUTION INTRA-ARTICULAR; INTRALESIONAL; INTRAMUSCULAR; INTRAVENOUS; SOFT TISSUE at 12:16

## 2022-12-01 RX ADMIN — LIDOCAINE HYDROCHLORIDE 100 MG: 20 INJECTION, SOLUTION EPIDURAL; INFILTRATION; INTRACAUDAL at 12:16

## 2022-12-01 RX ADMIN — CEFAZOLIN 2 G: 330 INJECTION, POWDER, FOR SOLUTION INTRAMUSCULAR; INTRAVENOUS at 12:25

## 2022-12-01 RX ADMIN — PROPAFENONE HYDROCHLORIDE 150 MG: 150 TABLET, FILM COATED ORAL at 20:18

## 2022-12-01 RX ADMIN — PROPOFOL 100 MG: 10 INJECTION, EMULSION INTRAVENOUS at 13:14

## 2022-12-01 RX ADMIN — HYDROMORPHONE HYDROCHLORIDE 2 MG: 2 INJECTION INTRAMUSCULAR; INTRAVENOUS; SUBCUTANEOUS at 12:16

## 2022-12-01 RX ADMIN — DOCUSATE SODIUM 100 MG: 100 CAPSULE, LIQUID FILLED ORAL at 16:59

## 2022-12-01 RX ADMIN — POLYETHYLENE GLYCOL 3350 1 PACKET: 17 POWDER, FOR SOLUTION ORAL at 19:58

## 2022-12-01 RX ADMIN — ACETAMINOPHEN 1000 MG: 500 TABLET ORAL at 16:59

## 2022-12-01 RX ADMIN — METHOCARBAMOL 1000 MG: 100 INJECTION INTRAMUSCULAR; INTRAVENOUS at 17:01

## 2022-12-01 RX ADMIN — OXYCODONE HYDROCHLORIDE 5 MG: 5 SOLUTION ORAL at 15:35

## 2022-12-01 RX ADMIN — METHOCARBAMOL 1000 MG: 100 INJECTION INTRAMUSCULAR; INTRAVENOUS at 10:22

## 2022-12-01 RX ADMIN — CEFAZOLIN 2 G: 2 INJECTION, POWDER, FOR SOLUTION INTRAMUSCULAR; INTRAVENOUS at 06:21

## 2022-12-01 RX ADMIN — ROCURONIUM BROMIDE 50 MG: 10 INJECTION, SOLUTION INTRAVENOUS at 12:16

## 2022-12-01 RX ADMIN — CALCIUM CARBONATE 500 MG: 500 TABLET, CHEWABLE ORAL at 17:00

## 2022-12-01 RX ADMIN — METHOCARBAMOL 1000 MG: 100 INJECTION INTRAMUSCULAR; INTRAVENOUS at 03:19

## 2022-12-01 RX ADMIN — HYDROMORPHONE HYDROCHLORIDE 0.25 MG: 1 INJECTION, SOLUTION INTRAMUSCULAR; INTRAVENOUS; SUBCUTANEOUS at 08:33

## 2022-12-01 RX ADMIN — ACETAMINOPHEN 1000 MG: 500 TABLET ORAL at 12:12

## 2022-12-01 RX ADMIN — SODIUM CHLORIDE: 9 INJECTION, SOLUTION INTRAVENOUS at 16:29

## 2022-12-01 ASSESSMENT — COGNITIVE AND FUNCTIONAL STATUS - GENERAL
SUGGESTED CMS G CODE MODIFIER DAILY ACTIVITY: CJ
MOVING FROM LYING ON BACK TO SITTING ON SIDE OF FLAT BED: A LITTLE
WALKING IN HOSPITAL ROOM: A LITTLE
DAILY ACTIVITIY SCORE: 21
STANDING UP FROM CHAIR USING ARMS: A LITTLE
DRESSING REGULAR LOWER BODY CLOTHING: A LITTLE
SUGGESTED CMS G CODE MODIFIER MOBILITY: CJ
CLIMB 3 TO 5 STEPS WITH RAILING: A LITTLE
MOBILITY SCORE: 20
TOILETING: A LITTLE
HELP NEEDED FOR BATHING: A LITTLE

## 2022-12-01 ASSESSMENT — PAIN DESCRIPTION - PAIN TYPE
TYPE: SURGICAL PAIN
TYPE: SURGICAL PAIN
TYPE: ACUTE PAIN;SURGICAL PAIN
TYPE: SURGICAL PAIN
TYPE: ACUTE PAIN;SURGICAL PAIN

## 2022-12-01 ASSESSMENT — PAIN SCALES - GENERAL: PAIN_LEVEL: 0

## 2022-12-01 NOTE — OR NURSING
1411: Pt arrived from OR post stage II L5-S1 fusion under anesthesia. Pt is asleep. Lumbar sights x 2 with dermabond are CDI. Cardiac rhythm appears to be SB.     1417: Pt able to move arms; oral airway removed. Pt back to sleep quickly.     1430: Report to ZIYAD Dukes.     1515: RN returned to care. Pt is awake now; oriented. Sight is still CDI. Pt reports pain is manageable (5/10) in lower back. Pt tolerating water.     1540: RN left VM with callback for pt's wife, Mariella.     1350: Report to ZIYAD Medina.     1602: Pt back to room via bed with transport.

## 2022-12-01 NOTE — CARE PLAN
Problem: Pain - Standard  Goal: Alleviation of pain or a reduction in pain to the patient’s comfort goal  Outcome: Progressing     Problem: Knowledge Deficit - Standard  Goal: Patient and family/care givers will demonstrate understanding of plan of care, disease process/condition, diagnostic tests and medications  Outcome: Progressing   The patient is Stable - Low risk of patient condition declining or worsening    Shift Goals  Clinical Goals: pain control  Patient Goals: rest    Progress made toward(s) clinical / shift goals:  pain managed with prescribed medications.    Patient is not progressing towards the following goals:

## 2022-12-01 NOTE — ANESTHESIA POSTPROCEDURE EVALUATION
Patient: Chung Griffiths    Procedure Summary     Date: 12/01/22 Room / Location: [facility] 04 / SURGERY [facility]    Anesthesia Start: 1216 Anesthesia Stop: 1413    Procedure: STAGE#2 LUMBAR 5- SACRAL 1 PERCUTANEOUS FUSION WITH O-ARM (Spine Lumbar) Diagnosis: (SPONDYLOLISTHESIS LUMBAR REGION, LUMBAR DISC DEGENERATION)    Surgeons: Jeremy Treviño M.D. Responsible Provider: James James M.D.    Anesthesia Type: general ASA Status: 2          Final Anesthesia Type: general  Last vitals  BP   Blood Pressure: (!) 148/79    Temp   36.2 °C (97.1 °F)    Pulse   65   Resp   18    SpO2   95 %      Anesthesia Post Evaluation    Patient location during evaluation: PACU  Patient participation: complete - patient participated  Level of consciousness: sleepy but conscious  Pain score: 0    Airway patency: patent  Anesthetic complications: no  Cardiovascular status: hemodynamically stable  Respiratory status: acceptable  Hydration status: euvolemic    PONV: none          No notable events documented.     Nurse Pain Score: 3 (NPRS)

## 2022-12-01 NOTE — PROGRESS NOTES
Bedside report received. Assumed care of patient this PM. Assessment completed      Patient is A&O x 4, pt calls for assistance appropriately  Reports 5 /10 pain, prn medication administered.  Pt is on r/a oxygen, CPAP at night  Mobility up self with FWW   Bed alarm in off.  Voiding +  Flatus + No BM since 11/29, per patient he has a BM 2-3x/day            Plan of care reviewed with the patient. Bed is locked and in the lowest position. Call light is within reach. Patient encouraged to voice needs and concerns, all needs met at this time. Hourly rounding in place.

## 2022-12-01 NOTE — PROGRESS NOTES
Neurosurgery Progress Note    Subjective:  POD#1 Stage #1 L5/S1 ALIF.   Doing well.   Tolerable post-op pain.   No new radicular pain, no new numbness/tingling.   Ambulating.   Voiding.   Passing flatus.   Currently NPO.      Exam:  Dressing CDI with minimal strikethrough.   Motor 5/5 in bilateral lower extremities.   Sensation intact.       BP  Min: 108/71  Max: 144/88  Pulse  Av.8  Min: 59  Max: 73  Resp  Avg: 15.5  Min: 15  Max: 16  Temp  Av.4 °C (97.5 °F)  Min: 36.1 °C (96.9 °F)  Max: 36.9 °C (98.5 °F)  SpO2  Av %  Min: 91 %  Max: 96 %    No data recorded    Recent Labs     22  033   WBC 14.2*   RBC 4.09*   HEMOGLOBIN 12.7*   HEMATOCRIT 36.4*   MCV 89.0   MCH 31.1   MCHC 34.9   RDW 41.3   PLATELETCT 249   MPV 9.4     Recent Labs     22  0334   SODIUM 139   POTASSIUM 4.0   CHLORIDE 105   CO2 21   GLUCOSE 173*   BUN 17   CREATININE 0.77   CALCIUM 9.4               Intake/Output                         22 0700 - 22 0659 22 07 - 22 0659     [id number] 5827-0426 Total 4895-2190 6515-1811 Total                 Intake    I.V.  500  -- 500  --  -- --    Volume (mL) (lactated ringers infusion) 500 -- 500 -- -- --    Total Intake 500 -- 500 -- -- --       Output    Urine  300  -- 300  --  -- --    Urine 300 -- 300 -- -- --    Number of Times Voided 1 x -- 1 x -- -- --    Blood  50  -- 50  --  -- --    Est. Blood Loss 50 -- 50 -- -- --    Total Output 350 -- 350 -- -- --       Net I/O     150 -- 150 -- -- --            No intake or output data in the 24 hours ending 22 1151          [MAR Hold] metoprolol SR  25 mg DAILY    [MAR Hold] propafenone  150 mg Q8HRS    [MAR Hold] Pharmacy Consult Request  1 Each PHARMACY TO DOSE    [MAR Hold] MD ALERT...DO NOT ADMINISTER NSAIDS or ASPIRIN unless ORDERED By Neurosurgery  1 Each PRN    [MAR Hold] docusate sodium  100 mg BID    [MAR Hold] senna-docusate  1 Tablet Nightly    [MAR Hold] senna-docusate  1 Tablet Q24HRS PRN     [MAR Hold] polyethylene glycol/lytes  1 Packet BID PRN    [MAR Hold] magnesium hydroxide  30 mL QDAY PRN    [MAR Hold] bisacodyl  10 mg Q24HRS PRN    [MAR Hold] sodium phosphate  1 Each Once PRN    NS   Continuous    [MAR Hold] acetaminophen  1,000 mg Q6HRS    Followed by    [MAR Hold] acetaminophen  1,000 mg Q6HRS PRN    [MAR Hold] oxyCODONE immediate-release  2.5 mg Q3HRS PRN    Or    [MAR Hold] oxyCODONE immediate-release  5 mg Q3HRS PRN    Or    [MAR Hold] HYDROmorphone  0.25 mg Q3HRS PRN    [MAR Hold] methocarbamol  750 mg Q8HRS PRN    [MAR Hold] temazepam  15 mg HS PRN - MR X 1    labetalol  10 mg Q HOUR PRN    hydrALAZINE  10 mg Q HOUR PRN    [MAR Hold] menthol  1 Lozenge Q2HRS PRN    [MAR Hold] calcium carbonate  500 mg BID    [MAR Hold] vitamin D3  5,000 Units DAILY    Methocarbamol IVPB  1,000 mg Q8HRS       Assessment and Plan:  Hospital day # 2  POD# 1/0  Plan for Stage #2 percutaneous L5/S1 fusion today.   Labs and vitals stable for stage 2 today.   Currently NPO.   STAT upright lumbar x-rays ordered to evaluate stage 1 hardware, spoke with pre-op nurse who will assist with getting this done.   Ready to proceed, questions answered.     Chemical prophylactic DVT therapy: No  Start date/time: start tomorrow after stage 2 today

## 2022-12-01 NOTE — CARE PLAN
The patient is Stable - Low risk of patient condition declining or worsening    Shift Goals  Clinical Goals: ABX, NPO at midnight  Patient Goals: Rest    Progress made toward(s) clinical / shift goals:      Problem: Pain - Standard  Goal: Alleviation of pain or a reduction in pain to the patient’s comfort goal  Outcome: Progressing     Problem: Knowledge Deficit - Standard  Goal: Patient and family/care givers will demonstrate understanding of plan of care, disease process/condition, diagnostic tests and medications  Outcome: Progressing       Patient is not progressing towards the following goals:

## 2022-12-01 NOTE — ANESTHESIA PROCEDURE NOTES
Airway    Date/Time: 12/1/2022 12:21 PM  Performed by: James James M.D.  Authorized by: James James M.D.     Location:  OR  Urgency:  Elective  Indications for Airway Management:  Anesthesia      Spontaneous Ventilation: absent    Sedation Level:  Deep  Preoxygenated: Yes    Patient Position:  Sniffing  Mask Difficulty Assessment:  2 - vent by mask + OA or adjuvant +/- NMBA  Final Airway Type:  Endotracheal airway  Final Endotracheal Airway:  ETT  Cuffed: Yes    Technique Used for Successful ETT Placement:  Direct laryngoscopy    Insertion Site:  Oral  Blade Type:  Glide  Laryngoscope Blade/Videolaryngoscope Blade Size:  4  ETT Size (mm):  7.5  Measured from:  Teeth  ETT to Teeth (cm):  24  Placement Verified by: auscultation and capnometry    Cormack-Lehane Classification:  Grade I - full view of glottis  Number of Attempts at Approach:  1  Number of Other Approaches Attempted:  1  Unsuccessful Approach(es) for ETT:  Direct laryngoscopy   Very deep and inverted epliglottis with DL (Hines 3). Converted to Glidescope - easy. Mask ventilation challenging but successful with oral airway.

## 2022-12-01 NOTE — PROGRESS NOTES
4 Eyes Skin Assessment Completed by Meaghan, ZIYAD and ZIYAD Ryan.    Head WDL  Ears WDL  Nose WDL  Mouth WDL  Neck WDL  Breast/Chest WDL  Shoulder Blades Scab to posterior L shoulder  Spine WDL  (R) Arm/Elbow/Hand WDL  (L) Arm/Elbow/Hand WDL  Abdomen Incision  Groin WDL  Scrotum/Coccyx/Buttocks WDL  (R) Leg Scar to R knee  (L) Leg WDL  (R) Heel/Foot/Toe Redness and Blanching  (L) Heel/Foot/Toe Redness and Blanching          Devices In Places Patient's personal CPAP NOC      Interventions In Place Pillows and Pressure Redistribution Mattress    Possible Skin Injury No    Pictures Uploaded Into Epic N/A  Wound Consult Placed N/A  RN Wound Prevention Protocol Ordered No

## 2022-12-01 NOTE — OR SURGEON
Immediate Post OP Note    PreOp Diagnosis: lumbar spondylosis, lumbar radiculopathy, lumbar spondylolisthesis       PostOp Diagnosis: same      Procedure(s):  STAGE#2 LUMBAR 5- SACRAL 1 PERCUTANEOUS FUSION WITH O-ARM - Wound Class: Clean    Surgeon(s):  Jeremy Treviño M.D.    Anesthesiologist/Type of Anesthesia:  Anesthesiologist: James James M.D./General    Surgical Staff:  Circulator: Bibiana See R.N.; John Canales R.N.  Scrub Person: Felicita Akins  Radiology Technologist: Che Hines    Specimens removed if any:  * No specimens in log *    Estimated Blood Loss: 25mL    Findings: went well, see full op report    Complications: none        12/1/2022 2:16 PM Dena Marques P.A.-C.

## 2022-12-01 NOTE — ANESTHESIA PREPROCEDURE EVALUATION
Case: [id number] Date/Time: 12/01/22 1145    Procedure: STAGE#2 LUMBAR 5- SACRAL 1 PERCUTANEOUS FUSION WITH O-ARM    Pre-op diagnosis: SPONDYLOLISTHESIS LUMBAR REGION, LUMBAR DISC DEGENERATION    Location: [facility] 04 / SURGERY [facility]    Surgeons: Jeremy Treviño M.D.      Stage #2 lumbar fusion.     Tolerated procedure and anesthesia without complication yesterday.     Denies angina, dyspnea, GERD.    NPO.     Relevant Problems   ANESTHESIA   (positive) ENRICO on CPAP      CARDIAC   (positive) Arrhythmia   (positive) SVT (supraventricular tachycardia) (HCC)       Physical Exam    Airway   Mallampati: II  TM distance: >3 FB  Neck ROM: full       Cardiovascular - normal exam  Rhythm: regular  Rate: normal  (-) murmur     Dental - normal exam        Facial Hair   Pulmonary - normal exam  Breath sounds clear to auscultation     Abdominal    Neurological - normal exam                 Anesthesia Plan    ASA 2       Plan - general       Airway plan will be ETT          Induction: intravenous    Postoperative Plan: Postoperative administration of opioids is intended.    Pertinent diagnostic labs and testing reviewed    Informed Consent:    Anesthetic plan and risks discussed with patient.    Use of blood products discussed with: patient whom consented to blood products.

## 2022-12-01 NOTE — PROGRESS NOTES
Bedside report received.  Assessment complete.  A&O x 4. Patient calls appropriately.  Patient ambulates with no assist. Bed alarm off.   Patient has 3-5/10 pain. Pain managed with prescribed medications.  Denies N&V. NPO diet.  Surgical dressing CDI, small amount old drainage.  + void, + flatus, + BM.  Patient denies SOB.  SCD's off.  Patient is pleasant and cooperative with the care plan.  Review plan with of care with patient. Call light and personal belongings within reach. Hourly rounding in place. All needs met at this time.   BP (!) 148/79   Pulse 65   Temp 36.2 °C (97.1 °F) (Temporal)   Resp 18   Ht 1.829 m (6')   Wt 108 kg (238 lb 1.6 oz)   SpO2 95%   BMI 32.29 kg/m²

## 2022-12-02 ENCOUNTER — APPOINTMENT (OUTPATIENT)
Dept: RADIOLOGY | Facility: MEDICAL CENTER | Age: 57
DRG: 455 | End: 2022-12-02
Attending: PHYSICIAN ASSISTANT
Payer: COMMERCIAL

## 2022-12-02 VITALS
BODY MASS INDEX: 32.25 KG/M2 | SYSTOLIC BLOOD PRESSURE: 136 MMHG | WEIGHT: 238.1 LBS | RESPIRATION RATE: 18 BRPM | HEART RATE: 69 BPM | DIASTOLIC BLOOD PRESSURE: 87 MMHG | HEIGHT: 72 IN | TEMPERATURE: 97.8 F | OXYGEN SATURATION: 92 %

## 2022-12-02 LAB
ANION GAP SERPL CALC-SCNC: 9 MMOL/L (ref 7–16)
BUN SERPL-MCNC: 17 MG/DL (ref 8–22)
CALCIUM SERPL-MCNC: 8.9 MG/DL (ref 8.5–10.5)
CHLORIDE SERPL-SCNC: 108 MMOL/L (ref 96–112)
CO2 SERPL-SCNC: 22 MMOL/L (ref 20–33)
CREAT SERPL-MCNC: 0.65 MG/DL (ref 0.5–1.4)
ERYTHROCYTE [DISTWIDTH] IN BLOOD BY AUTOMATED COUNT: 44.4 FL (ref 35.9–50)
GFR SERPLBLD CREATININE-BSD FMLA CKD-EPI: 108 ML/MIN/1.73 M 2
GLUCOSE SERPL-MCNC: 129 MG/DL (ref 65–99)
HCT VFR BLD AUTO: 34.8 % (ref 42–52)
HGB BLD-MCNC: 11.8 G/DL (ref 14–18)
MCH RBC QN AUTO: 31.5 PG (ref 27–33)
MCHC RBC AUTO-ENTMCNC: 33.9 G/DL (ref 33.7–35.3)
MCV RBC AUTO: 92.8 FL (ref 81.4–97.8)
PLATELET # BLD AUTO: 201 K/UL (ref 164–446)
PMV BLD AUTO: 9.7 FL (ref 9–12.9)
POTASSIUM SERPL-SCNC: 4 MMOL/L (ref 3.6–5.5)
RBC # BLD AUTO: 3.75 M/UL (ref 4.7–6.1)
SODIUM SERPL-SCNC: 139 MMOL/L (ref 135–145)
WBC # BLD AUTO: 12.4 K/UL (ref 4.8–10.8)

## 2022-12-02 PROCEDURE — 700111 HCHG RX REV CODE 636 W/ 250 OVERRIDE (IP): Performed by: PHYSICIAN ASSISTANT

## 2022-12-02 PROCEDURE — 700102 HCHG RX REV CODE 250 W/ 637 OVERRIDE(OP): Performed by: PHYSICIAN ASSISTANT

## 2022-12-02 PROCEDURE — 85027 COMPLETE CBC AUTOMATED: CPT

## 2022-12-02 PROCEDURE — 97165 OT EVAL LOW COMPLEX 30 MIN: CPT

## 2022-12-02 PROCEDURE — A9270 NON-COVERED ITEM OR SERVICE: HCPCS | Performed by: PHYSICIAN ASSISTANT

## 2022-12-02 PROCEDURE — 97161 PT EVAL LOW COMPLEX 20 MIN: CPT

## 2022-12-02 PROCEDURE — 80048 BASIC METABOLIC PNL TOTAL CA: CPT

## 2022-12-02 PROCEDURE — 700105 HCHG RX REV CODE 258: Performed by: PHYSICIAN ASSISTANT

## 2022-12-02 RX ORDER — OXYCODONE HYDROCHLORIDE 5 MG/1
2.5-5 TABLET ORAL EVERY 4 HOURS PRN
Qty: 60 TABLET | Refills: 0 | Status: SHIPPED | OUTPATIENT
Start: 2022-12-02 | End: 2022-12-12

## 2022-12-02 RX ORDER — METHOCARBAMOL 750 MG/1
750 TABLET, FILM COATED ORAL EVERY 8 HOURS PRN
Qty: 42 TABLET | Refills: 0 | Status: SHIPPED | OUTPATIENT
Start: 2022-12-02 | End: 2022-12-16

## 2022-12-02 RX ADMIN — MAGNESIUM HYDROXIDE 30 ML: 400 SUSPENSION ORAL at 07:43

## 2022-12-02 RX ADMIN — ACETAMINOPHEN 1000 MG: 500 TABLET ORAL at 05:25

## 2022-12-02 RX ADMIN — PROPAFENONE HYDROCHLORIDE 150 MG: 150 TABLET, FILM COATED ORAL at 05:25

## 2022-12-02 RX ADMIN — DOCUSATE SODIUM 100 MG: 100 CAPSULE, LIQUID FILLED ORAL at 05:24

## 2022-12-02 RX ADMIN — METHOCARBAMOL 1000 MG: 100 INJECTION INTRAMUSCULAR; INTRAVENOUS at 01:45

## 2022-12-02 RX ADMIN — CALCIUM CARBONATE 500 MG: 500 TABLET, CHEWABLE ORAL at 05:24

## 2022-12-02 RX ADMIN — CHOLECALCIFEROL TAB 125 MCG (5000 UNIT) 5000 UNITS: 125 TAB at 05:23

## 2022-12-02 RX ADMIN — OXYCODONE 2.5 MG: 5 TABLET ORAL at 05:24

## 2022-12-02 RX ADMIN — ACETAMINOPHEN 1000 MG: 500 TABLET ORAL at 01:44

## 2022-12-02 ASSESSMENT — COGNITIVE AND FUNCTIONAL STATUS - GENERAL
SUGGESTED CMS G CODE MODIFIER DAILY ACTIVITY: CH
SUGGESTED CMS G CODE MODIFIER MOBILITY: CJ
MOBILITY SCORE: 21
CLIMB 3 TO 5 STEPS WITH RAILING: A LITTLE
WALKING IN HOSPITAL ROOM: A LITTLE
STANDING UP FROM CHAIR USING ARMS: A LITTLE
DAILY ACTIVITIY SCORE: 24

## 2022-12-02 ASSESSMENT — GAIT ASSESSMENTS
DEVIATION: NO DEVIATION
GAIT LEVEL OF ASSIST: SUPERVISED
DISTANCE (FEET): 250

## 2022-12-02 ASSESSMENT — PAIN DESCRIPTION - PAIN TYPE: TYPE: SURGICAL PAIN

## 2022-12-02 ASSESSMENT — ACTIVITIES OF DAILY LIVING (ADL): TOILETING: INDEPENDENT

## 2022-12-02 NOTE — DISCHARGE INSTRUCTIONS
No pushing, pulling or lifting greater than 10 pounds   No repetitive bending and twisting   Okay to shower, pat incision no dressing unless drainage to incision  Ambulate as much it is comfortable for you  No driving for at least two weeks after surgery  Obtain over the counter stool softener while you're taking narcotic pain medications

## 2022-12-02 NOTE — THERAPY
Physical Therapy   Initial Evaluation     Patient Name: Chung Griffiths  Age:  57 y.o., Sex:  male  Medical Record #: [id number]  Today's Date: 12/2/2022     Precautions: Spinal / Back Precautions   Comments: No brace per orders    Assessment  Patient is 57 y.o. male POD #2 L5/S1 ALIF and POD #1 L5-S1 percutaneous fusion. Pt instructed in spinal precautions and provided handout. He reports he is familiar with log roll sequencing for bed mob as he was performing pre-op for pain control. Pt demonstrates good strength in BLE 5/5, denies N/T. Today he was able to amb a total of 250ft with FWW for first 100ft. He negotiated 2 steps with single HR without difficulty. No further acute PT needs at this time.    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, primarily desk work/driving, able to avoid heavy lifting   Prior Level of Functional Mobility   Bed Mobility Independent   Transfer Status Independent   Ambulation Independent   Distance Ambulation (Feet)   (Community distances)   Assistive Devices Used None   Stairs Independent   Cognition    Cognition / Consciousness WDL   Level of Consciousness Alert   Comments Very pleasant and cooperative   Active ROM Lower Body    Active ROM Lower Body  WDL   Strength Lower Body   Lower Body Strength  WDL   Comments BLE strength 5/5   Sensation Lower Body   Lower Extremity Sensation   WDL   Comments Denies N/T throughout BLE   Neurological Concerns    Neurological Concerns No   Balance Assessment   Sitting Balance (Static) Good   Sitting Balance (Dynamic) Good   Standing Balance (Static) Fair +   Standing Balance (Dynamic) Fair +   Weight Shift Sitting Fair   Weight Shift Standing Fair   Comments w/ and w/o walker   Gait Analysis   Gait Level Of Assist Supervised   Assistive Device None   Distance (Feet) 250   # of Times Distance was Traveled 1   Deviation No deviation   # of Stairs Climbed 2   Level of Assist with Stairs Supervised   Weight Bearing Status No restrictions   Comments equal step length, used FWW for first 100ft, able to walk back to room withoiut AD   Bed Mobility    Comments NT - up in chair pre/post, reviewed log roll sequencing and pt is familiar as he has been practicing pre-op for back pain   Functional Mobility   Sit to Stand Supervised   Bed, Chair, Wheelchair Transfer Supervised   Transfer Method Stand Step

## 2022-12-02 NOTE — DISCHARGE SUMMARY
Date of Admission: 11/30/2022.  Date of Discharge: 12/2/2022.    Discharge Diagnosis: Lumbar spondylosis, lumbar radiculopathy, paresthesias, L5/S1 severe disc degeneration.     Surgery Performed:   Stage #1 L5/S1 ALIF 11/30.  Stage #2 L5/S1 percutaneous fusion 12/1.     Complications: None.    Reason for Admission: This is a pleasant 57 -year-old male who gives history of progressive low back pain with left greater than right lower extremtiy pain and paresthesias. The patient has tried and failed extensive conservative management. Their preoperative work-up showed significant degenerative changes at L5/S1 with severe disc dessication and loss of disc height and the patient was hereby indicated for the above surgery. The patient understood risks versus benefits and treatment alternatives and elected to proceed with the operation.    Hospital Course: The patient was admitted on the day of surgery and underwent the above surgery without complication.  After surgery the patient was transferred to the [facility] floor.  Here on the [facility], the patient has made a good recovery postoperatively. Now, on postoperative day #2/1, the patient's pain is well controlled on oral pain medications.  The patient is ambulating, voiding, tolerating oral food and medications well. He has had a BM. Motor exam is 5/5. The patient's incisions are clean, dry, and intact. The patient is now cleared for discharge home under stable condition after an uneventful hospital stay.    Discharge Instructions: The patient is to ambulate as much as tolerated.  The patient should avoid repetitive bending at the waist. They should avoid pushing, pulling or lifting greater than 15 pounds.  It is okay to shower but the patient is not to submerge the wound.  The patient shoud ice their incision for 10-15 minutes multiple times per day. It is okay to drive in 2 weeks' time or when comfortable and when no longer taking narcotic pain medication. They  should take over-the-counter stool softeners while taking narcotic pain medications. The patient is to eat a normal diet as tolerated. The patient has a postoperative appointment in 2 weeks' time at [facility]. The patient should call our office or go to the nearest emergency department with any emergent changes. The patient was given these discharge instructions verbally and voiced understanding of them.      Discharge Medications: In addition to the patient's normal home medications, they will be discharged home with prescriptons for,    methocarbamol 750 MG Tabs  Commonly known as: ROBAXIN    Take 1 Tablet by mouth every 8 hours as needed (spasm) for up to 14 days.  Dose: 750 mg         oxyCODONE immediate-release 5 MG Tabs  Commonly known as: ROXICODONE    Take 0.5-1 Tablets by mouth every four hours as needed for Severe Pain for up to 10 days.  Dose: 2.5-5 mg

## 2022-12-02 NOTE — THERAPY
Occupational Therapy   Initial Evaluation     Patient Name: Chung Griffiths  Age:  57 y.o., Sex:  male  Medical Record #: [id number]  Today's Date: 12/2/2022     Precautions  Precautions: Spinal / Back Precautions   Comments: No brace ordered    Assessment  Patient is 57 y.o. male with a diagnosis of two stage back surgery due to lumbar disc degeneration.  Additional factors influencing patient status / progress: Pt has supportive spouse to assist prn. Pt is high functioning at baseline and is demonstrating good precaution maintenance. Pt is able to perform ADLs at supervised level or higher. No further acute OT needs. Anticipate no post acute OT needs. 1 x only eval.     Plan    Recommend Occupational Therapy for Evaluation only   DC Equipment Recommendations: None  Discharge Recommendations: Anticipate that the patient will have no further occupational therapy needs after discharge from the hospital        12/02/22 0940   Prior Living Situation   Prior Services None   Housing / Facility 2 Story House   Steps Into Home 0  (ramp)   Steps In Home 19  (can stay on first floor)   Bathroom Set up Walk In Shower   Equipment Owned Front-Wheel Walker   Lives with - Patient's Self Care Capacity Spouse;Parents   Comments Wants to return to work soon; can avoid lifting and work half days for a short time.   Prior Level of ADL Function   Self Feeding Independent   Grooming / Hygiene Independent   Bathing Independent   Dressing Independent   Toileting Independent   Prior Level of IADL Function   Medication Management Independent   Laundry Independent   Kitchen Mobility Independent   Finances Independent   Home Management Independent   Shopping Independent   Driving / Transportation Driving Independent   History of Falls   History of Falls No   Precautions   Precautions Spinal / Back Precautions    Comments No brace ordered   Balance Assessment   Sitting Balance (Static) Good   Sitting Balance (Dynamic) Good   Standing  Balance (Static) Fair +   Standing Balance (Dynamic) Fair +   Weight Shift Sitting Fair   Weight Shift Standing Fair   Comments No LOB; mob without FWW   Bed Mobility    Supine to Sit Supervised   Sit to Supine Supervised   Scooting Supervised   Rolling Supervised   ADL Assessment   Eating Independent   Grooming Standing;Supervision   Lower Body Dressing Supervision   Toileting Supervision   Functional Mobility   Sit to Stand Supervised   Bed, Chair, Wheelchair Transfer Supervised   Toilet Transfers Standby Assist   Transfer Method Stand Step   Comments No AD used   Activity Tolerance   Sitting in Chair up in chair after eval   Sitting Edge of Bed 10 min   Standing 10 min   Problem List   Problem List None   Anticipated Discharge Equipment and Recommendations   DC Equipment Recommendations None   Discharge Recommendations Anticipate that the patient will have no further occupational therapy needs after discharge from the hospital

## 2022-12-02 NOTE — CARE PLAN
The patient is Stable - Low risk of patient condition declining or worsening    Shift Goals  Clinical Goals: PT, OT, BM  Patient Goals: PT, OT, BM  Family Goals: No family present    Progress made toward(s) clinical / shift goals:  Yes      Problem: Pain - Standard  Goal: Alleviation of pain or a reduction in pain to the patient’s comfort goal  Outcome: Progressing   Medications administered for pain per MAR.     Problem: Knowledge Deficit - Standard  Goal: Patient and family/care givers will demonstrate understanding of plan of care, disease process/condition, diagnostic tests and medications  Outcome: Progressing   Educated the pt on POC. Pt verbalized understanding.

## 2022-12-02 NOTE — PROGRESS NOTES
Neurosurgery Progress Note    Subjective:  POD#2 Stage #1 L5/S1 ALIF.   POD#1 Stage #2 L5/S1 percutaneous fusion.   Doing well.   Tolerable post-op pain.   No new radicular pain, no new numbness/tingling.   Ambulating.   Voiding.   +BM.  Post-op x-rays completed and reviewed.   Evaluated by PT.   Anxious to go home.       Exam:  Anterior dressing CDI with minimal strikethrough.   Posterior incisions CDI.   Motor 5/5 in bilateral lower extremities.   Sensation intact.       BP  Min: 110/67  Max: 152/85  Pulse  Av.5  Min: 50  Max: 80  Resp  Av.7  Min: 10  Max: 26  Temp  Av.4 °C (104.8 °F)  Min: 36.2 °C (97.1 °F)  Max: 65 °C (149 °F)  SpO2  Av.3 %  Min: 89 %  Max: 100 %    No data recorded    Recent Labs     22  0334 22  0444   WBC 14.2* 12.4*   RBC 4.09* 3.75*   HEMOGLOBIN 12.7* 11.8*   HEMATOCRIT 36.4* 34.8*   MCV 89.0 92.8   MCH 31.1 31.5   MCHC 34.9 33.9   RDW 41.3 44.4   PLATELETCT 249 201   MPV 9.4 9.7       Recent Labs     22  0334 22  0444   SODIUM 139 139   POTASSIUM 4.0 4.0   CHLORIDE 105 108   CO2 21 22   GLUCOSE 173* 129*   BUN 17 17   CREATININE 0.77 0.65   CALCIUM 9.4 8.9                 Intake/Output                         22 07 - 22 0659 22 07 - 22 0659     [id number] [id number] Total 0794-8464 0928-3860 Total                 Intake    P.O.  240  -- 240  --  -- --    P.O. 240 -- 240 -- -- --    I.V.  600  -- 600  --  -- --    Propofol Volume 600 -- 600 -- -- --    Total Intake 840 -- 840 -- -- --       Output    Urine  --  -- --  --  -- --    Number of Times Voided 1 x -- 1 x -- -- --    Stool  --  -- --  --  -- --    Number of Times Stooled -- -- -- 1 x -- 1 x    Blood  25  -- 25  --  -- --    Est. Blood Loss 25 -- 25 -- -- --    Total Output 25 -- 25 -- -- --       Net I/O     815 -- 815 -- -- --              Intake/Output Summary (Last 24 hours) at 2022 1100  Last data filed at 2022 1630  Gross per 24 hour   Intake 840  ml   Output 25 ml   Net 815 ml             enoxaparin (LOVENOX) injection  40 mg Daily-0600    metoprolol SR  25 mg DAILY    propafenone  150 mg Q8HRS    Pharmacy Consult Request  1 Each PHARMACY TO DOSE    MD KASHIF...DO NOT ADMINISTER NSAIDS or ASPIRIN unless ORDERED By Neurosurgery  1 Each PRN    docusate sodium  100 mg BID    senna-docusate  1 Tablet Nightly    senna-docusate  1 Tablet Q24HRS PRN    polyethylene glycol/lytes  1 Packet BID PRN    magnesium hydroxide  30 mL QDAY PRN    bisacodyl  10 mg Q24HRS PRN    sodium phosphate  1 Each Once PRN    NS   Continuous    acetaminophen  1,000 mg Q6HRS    Followed by    [START ON 12/5/2022] acetaminophen  1,000 mg Q6HRS PRN    oxyCODONE immediate-release  2.5 mg Q3HRS PRN    Or    oxyCODONE immediate-release  5 mg Q3HRS PRN    Or    HYDROmorphone  0.25 mg Q3HRS PRN    methocarbamol  750 mg Q8HRS PRN    temazepam  15 mg HS PRN - MR X 1    labetalol  10 mg Q HOUR PRN    hydrALAZINE  10 mg Q HOUR PRN    menthol  1 Lozenge Q2HRS PRN    calcium carbonate  500 mg BID    vitamin D3  5,000 Units DAILY       Assessment and Plan:  Hospital day #3  POD# 2/1  Patient cleared for discharge home.   Change anterior dressing.   Has FWW at home if needed.   Discharge instructions discussed.   Follow-up in 2 weeks at [facility] for already scheduled post-op, call with questions [phone].    Chemical prophylactic DVT therapy: yes  Start date/time: today

## 2022-12-02 NOTE — PROGRESS NOTES
Bedside report received. Assumed care of patient this PM. Assessment completed      Patient is A&O x 4, pt calls for assistance appropriately  Reports 4 /10 pain, prn medication administered.  Pt is on r/a with home CPAP at night  Mobility up self   Bed alarm in off.  Voiding +  Flatus + Last BM 11/29. This is outside patient's patterns. States that he has 2-3 BMs a day. Escalated from senna to Miralax this shift.         Plan of care reviewed with the patient. Bed is locked and in the lowest position. Call light is within reach. Patient encouraged to voice needs and concerns, all needs met at this time. Hourly rounding in place.

## 2022-12-02 NOTE — CARE PLAN
The patient is Stable - Low risk of patient condition declining or worsening    Shift Goals  Clinical Goals: Pain control, rest, monitor vitals, ambulate, BM  Patient Goals: BM, rest, ambulate    Progress made toward(s) clinical / shift goals:  Ambulated in hallway with wife at start of shift, PRN pain medication given with pt reporting adequate pain control; VSS, no BM this shift, however patient administered miralax instead of senna,     Problem: Pain - Standard  Goal: Alleviation of pain or a reduction in pain to the patient’s comfort goal  Outcome: Progressing     Problem: Knowledge Deficit - Standard  Goal: Patient and family/care givers will demonstrate understanding of plan of care, disease process/condition, diagnostic tests and medications  Outcome: Progressing       Patient is not progressing towards the following goals: No BM this shift.

## 2022-12-05 NOTE — OP REPORT
DATE OF SERVICE:  12/01/2022     PREOPERATIVE DIAGNOSES:    1.  Lumbar spondylosis at L5-S1.  2.  Severe disk degeneration L5-S1 with loss of disk height.  3.  Left lower extremity radiculopathy.  4.  Status post L5-S1 ALIF.     POSTOPERATIVE DIAGNOSES:  1.  Lumbar spondylosis at L5-S1.  2.  Severe disk degeneration L5-S1 with loss of disk height.  3.  Left lower extremity radiculopathy.  4.  Status post L5-S1 ALIF.     PROCEDURES PERFORMED:  1.  L5 through S1 posterior lumbar instrumentation.  1.  Use of intraoperative neuromonitoring, stable throughout the case.  2.  Use of intraoperative neuronavigation, Augmedics.     SURGEON:  Jeremy Treviño MD     ASSISTANT:  Dena Marques PA-C     ANESTHESIOLOGIST:  James James MD     ESTIMATED BLOOD LOSS:  25 mL     COMPLICATIONS:  None.     FINDINGS:  Well-positioned instrumentation confirmed by postoperative CT.     INDICATIONS FOR PROCEDURE:  The patient is a 57-year-old male who presented to   my outpatient clinic with back pain, left lower extremity radiculopathy,   found to have severe disk degeneration at L5-S1.  He failed conservative   management and wanted to proceed with an L5-S1 ALIF and posterior lumbar   instrumentation. Today is stage II, which is the posterior instrumentation   portion of the procedure.  Risks and benefits were discussed with the patient   thoroughly documented in my previous operative note.     DESCRIPTION OF PROCEDURE:  The patient was brought to the operating theater,   placed on a Jerrell OSI table and induced by anesthesia.  Neuromonitoring was   applied in all four extremities with strong signal. The patient was prepped   and draped in sterile fashion.  Timeout occurred and preoperative antibiotics   were given.  I made a stab incision over the right posterior superior iliac   spine and malleted the percutaneous pin into the right ilium.  I placed the   Augmedics array on this and then Z-link over the planned operative field.   The   patient was draped for a sterile O-arm spin.  After the O-arm spin the data   was loaded into the RetiDiag computer.  I placed on the RetiDiag helmet and   scrubbed back into the case.  Using RetiDiag navigation, I planned screw   trajectories on the patient's skin and marked them.  I then made 2 paramedian   incisions approximately 2 cm each.  Starting on the left side, began the   instrumentation portion of the procedure.  I used the Jamshidi needle to   verify accuracy of the O-arm spin and then used a high-speed bur to drill off   the entry point of the facet off the planned Augmedics trajectory.  I then   used a 5.5 tap to cannulate the pedicle before placing a Corry XT minimally   invasive pedicle screw at left L5 measuring 6.5x50.  At left S1 I performed   the same procedure and placed a 7.5x50 screw.  I then transitioned over the   right side.  I again checked for accuracy using a Jamshidi needle and then   placed a right L5 pedicle screw by first drilling out the entry point in the   facet tapping through the pedicle and then placing a 6.5x50 screw and at S1, I   placed a 7.5x50 screw.  With the screws in place, I obtained x-ray   fluoroscopy to verify positioning, I satisfied with positioning.  I then   measured the alvaro length and then passed a 5.5 titanium alvaro measuring 35 mm   subfascially capturing each reduction tower.  I then gently applied set screws   at all 4 screws.  I obtained x-rays and at this point I noted excellent   position of all instrumentation.  I final tightened all set screws, broke off   the reduction tabs.  We obtained final x-rays, which showed excellent position   of all instrumentation.  I irrigated out the wound, closed the wound in   layers, the fascia closed with 0 Vicryl, the dermis closed with 2-0 Vicryl and   skin closed with Dermabond.  There were no complications noted.  Sponge and   needle counts were correct x2.  The patient was turned over to anesthesia for    extubation.        ______________________________  MD NIYA Dudley/JITENDRA    DD:  12/05/2022 10:33  DT:  12/05/2022 11:25    Job#:  [id number]

## 2022-12-05 NOTE — OP REPORT
DATE OF SERVICE:  11/30/2022     PREOPERATIVE DIAGNOSES:   1.  L5-S1 spondylosis with loss of disk height.  2.  Severe degenerative disk disease.  3.  Lumbar extremity radiculopathy.     POSTOPERATIVE DIAGNOSES:  1.  L5-S1 spondylosis with loss of disk height.  2.  Severe degenerative disk disease.  3.  Lumbar extremity radiculopathy.     PROCEDURES PERFORMED:  1.  L5-S1 anterior lumbar diskectomy.  2.  L5-S1 placement of titanium interbody, Marathon Ormsby [id number] with 15   degrees lordosis.  3.  L5-S1 arthrodesis with Merced demineralized bone matrix.  4.  Use of intraoperative neuromonitoring, stable throughout the case.     ASSISTANT:  Radha Loaiza PA-C     ASSISTANT SURGEON:  Be Perez MD     ANESTHESIOLOGIST:  Kirill Arshad MD     COMPLICATIONS:  None.     FINDINGS:  Well-positioned interbody.     ESTIMATED BLOOD LOSS:  50 mL.     INDICATIONS FOR PROCEDURE:  The patient is a pleasant 57-year-old male who   presented to my outpatient clinic with back pain, left lower extremity   radiculopathy.  Imaging demonstrates severe disk degeneration at L5-S1 with   loss of disk height and foraminal stenosis.  The patient failed conservative   management and opted for surgical treatment.  I offered the patient an L5-S1   ALIF followed by percutaneous instrumentation on day 2.  Risks and benefits   were thoroughly discussed with the patient.  Risks include but not limited to   bleeding, infection, CSF leak, need for additional surgery, postoperative   hematoma, failure of instrumentation.  Additionally, related to the exposure,   there is a risk to major blood vessel injury as well as retrograde   ejaculation.  There is also risk of damage to neurologic elements includes not   limited to paralysis, sensory changes, bowel or bladder incontinence and loss   of sexual function.  Despite these risks, the patient wished proceed.     DESCRIPTION OF PROCEDURE:  Informed consent was obtained by the patient.   The   patient was brought to the operating theater and induced by anesthesia.  The   patient was placed on a flat Jerrell table.  All pressure points padded.    Neuromonitoring was applied and strong signals in all four extremities.    Please refer to Dr. Perez's note for details of the approach.  After the   L5-S1 disk space was identified and exposed, I scrubbed into the procedure.  I   verified the L5-S1 disk space with fluoroscopy and made an annulotomy with a   15 blade and using an upgoing curette to remove some of the disk material as   well as pituitary rongeur.  I then used a Kerrison rongeur to widen the   annulotomy bilaterally.  This disk space was rather collapsed, I malleted a   Pollock into the disk space and gently distracted and was able to get several   millimeters of distraction.  I then malleted in 7 mm tony was not able to get   this into the disk space.  So, I then used Pollock laterally in the disk space to   help free up.  I distracted the disk space a little bit.  I was able to get   several millimeters of expansion.  I then used an upgoing curette to complete   a diskectomy.  At this point, the disk space was a little bit more mobile.  I   was able into mallet a 7 mm tony and upsized it accordingly.  I then malleted   into a disk space distractor was able to distract the disk space and opened up   the spacing.  The disk space distracted and diskectomy complete, I prepped   the endplates with upgoing curette, scraping off any residual disk fragment.    I then trialled several interbodies deciding on a 15-degree lordotic   interbody.  This matched his natural lordosis quite nicely.  The Slater   [place] interbody measuring [id number] was opened and then packed with Rachel   demineralized bone matrix for arthrodesis.  This was carefully malleted into   place under fluoroscopy.  Once in place, I obtained AP, lateral fluoroscopy,   was satisfied with the position, I then used an awl to predrill  holes into the   vertebral bodies and then placed 25 mm screws, one screw going into L5 and 2   screws going into S1.  I locked the interbody and obtained final x-rays.  I   was satisfied with positioning of all instrumentation.  We then irrigated out   the wound and obtained hemostasis.  Please refer to Dr. Perez's note for   closure.  There were no complications noted.  Sponge and needle counts were   correct x2.  Neuromonitoring was stable throughout the case.  The patient was   turned to anesthesia for extubation.        ______________________________  MD NIYA Dudley/JITENDRA/BORIS    DD:  12/05/2022 10:13  DT:  12/05/2022 10:56    Job#:  [id number]

## 2022-12-19 ENCOUNTER — TELEPHONE (OUTPATIENT)
Dept: CARDIOLOGY | Facility: MEDICAL CENTER | Age: 57
End: 2022-12-19
Payer: COMMERCIAL

## 2022-12-19 NOTE — TELEPHONE ENCOUNTER
Review of his chart recommends that he may benefit from statin therapy to reduce the bad cholesterol.    According to his lipid panel and risk factors the 10-year risk of heart attack or other complications of atherosclerotic heart disease is:    The 10-year ASCVD risk score (Clarence PAUL, et al., 2019) is: 11.3%    Please call him and see if he would like to take atorvastatin 20 mg daily to reduced this risk by at least 20%    This is of course in addition to heart healthy lifestyle, diet and exercise.  Work on at least 1.5 hours a week of moderate exercise (typical brisk walking or similar activity)  LOW SALT DIET  KEEP YOUR SODIUM EQUAL TO CALORIES AND NO MORE THAN DOUBLE THE CALORIES FOR A LOW SALT DIET  Cardiosmart.org - great resource for American College of Cardiology on heart disease prevention and treatment    Thank you    Lab Results   Component Value Date/Time    CHOLSTRLTOT 197 12/07/2021 01:20 AM     (H) 12/07/2021 01:20 AM    HDL 35 (A) 12/07/2021 01:20 AM    TRIGLYCERIDE 253 (H) 12/07/2021 01:20 AM       Lab Results   Component Value Date/Time    SODIUM 139 12/02/2022 04:44 AM    POTASSIUM 4.0 12/02/2022 04:44 AM    CHLORIDE 108 12/02/2022 04:44 AM    CO2 22 12/02/2022 04:44 AM    GLUCOSE 129 (H) 12/02/2022 04:44 AM    BUN 17 12/02/2022 04:44 AM    CREATININE 0.65 12/02/2022 04:44 AM     Lab Results   Component Value Date/Time    ALKPHOSPHAT 118 (H) 12/07/2021 01:20 AM    ASTSGOT 21 12/07/2021 01:20 AM    ALTSGPT 50 12/07/2021 01:20 AM    TBILIRUBIN 0.5 12/07/2021 01:20 AM

## 2022-12-20 ENCOUNTER — PATIENT MESSAGE (OUTPATIENT)
Dept: CARDIOLOGY | Facility: MEDICAL CENTER | Age: 57
End: 2022-12-20
Payer: COMMERCIAL

## 2022-12-20 NOTE — TELEPHONE ENCOUNTER
Upon chart review, pt is active on [employer].  Last login 12/20/22.    [employer] message sent to pt regarding MD recommendations.    Awaiting pt response.

## 2023-05-15 ENCOUNTER — TELEPHONE (OUTPATIENT)
Dept: CARDIOLOGY | Facility: MEDICAL CENTER | Age: 58
End: 2023-05-15
Payer: COMMERCIAL

## 2023-05-15 DIAGNOSIS — I20.0 UNSTABLE ANGINA (HCC): ICD-10-CM

## 2023-05-15 NOTE — TELEPHONE ENCOUNTER
Dr. Shayan Sargent ordered a [facility] w/poss for this patient while he was in clinic in [place]. Can you please reenter this order under our office so the schedulers at the hospital can see it and put this patient on the schedule?    Thank You,  Ela

## 2023-05-15 NOTE — OR NURSING
COVID-19 Pre-surgery screenin. Do you have an undiagnosed respiratory illness or symptoms such as coughing or sneezing? No  a. Onset of Sx  n/a  b. Acute vs. chronic respiratory illness  n/a    2. Do you have an unexplained fever greater than 100.4 degrees Fahrenheit or 38 degrees Celsius?  No        3. Have you had direct exposure to a patient who tested positive for Covid-19?  No        4. Have you had any loss of your sense of taste or smell, N/V or sore throat?  No    Patient has been informed of 2 visitor policy and asked to wear a mask at all times.   Yes   Detail Level: Detailed Quality 110: Preventive Care And Screening: Influenza Immunization: Influenza Immunization not Administered for Documented Reasons. Quality 226: Preventive Care And Screening: Tobacco Use: Screening And Cessation Intervention: Patient screened for tobacco use and is an ex/non-smoker

## 2023-05-16 NOTE — TELEPHONE ENCOUNTER
Patient scheduled for C w/poss on 6-2-23 with Dr. Salas. Patient has been instructed to check in at 6:30 for 8:30 case time. Message sent to authorizations.

## 2023-05-17 NOTE — TELEPHONE ENCOUNTER
Order form faxed to  to sign to [facility].  Received notification signed form imported to media tab.  Noted document imported today at 0844.

## 2023-05-18 ENCOUNTER — APPOINTMENT (OUTPATIENT)
Dept: ADMISSIONS | Facility: MEDICAL CENTER | Age: 58
End: 2023-05-18
Attending: INTERNAL MEDICINE
Payer: COMMERCIAL

## 2023-05-24 ENCOUNTER — PRE-ADMISSION TESTING (OUTPATIENT)
Dept: ADMISSIONS | Facility: MEDICAL CENTER | Age: 58
End: 2023-05-24
Attending: INTERNAL MEDICINE
Payer: COMMERCIAL

## 2023-05-24 NOTE — OR NURSING
Patient [place] out of the area. Unable to complete lab work prior to procedure. Will plan to complete pre-op lab work day of procedure.

## 2023-06-02 ENCOUNTER — APPOINTMENT (OUTPATIENT)
Dept: CARDIOLOGY | Facility: MEDICAL CENTER | Age: 58
End: 2023-06-02
Attending: INTERNAL MEDICINE
Payer: COMMERCIAL

## 2023-06-02 ENCOUNTER — PHARMACY VISIT (OUTPATIENT)
Dept: PHARMACY | Facility: MEDICAL CENTER | Age: 58
End: 2023-06-02
Payer: COMMERCIAL

## 2023-06-02 ENCOUNTER — TELEPHONE (OUTPATIENT)
Dept: CARDIOLOGY | Facility: MEDICAL CENTER | Age: 58
End: 2023-06-02

## 2023-06-02 ENCOUNTER — HOSPITAL ENCOUNTER (OUTPATIENT)
Facility: MEDICAL CENTER | Age: 58
End: 2023-06-02
Attending: INTERNAL MEDICINE | Admitting: INTERNAL MEDICINE
Payer: COMMERCIAL

## 2023-06-02 VITALS
WEIGHT: 240.3 LBS | HEART RATE: 53 BPM | RESPIRATION RATE: 16 BRPM | HEIGHT: 74 IN | TEMPERATURE: 97.5 F | BODY MASS INDEX: 30.84 KG/M2 | SYSTOLIC BLOOD PRESSURE: 148 MMHG | OXYGEN SATURATION: 96 % | DIASTOLIC BLOOD PRESSURE: 94 MMHG

## 2023-06-02 DIAGNOSIS — I20.0 UNSTABLE ANGINA (HCC): ICD-10-CM

## 2023-06-02 PROBLEM — Z95.5 PRESENCE OF STENT IN ANTERIOR DESCENDING BRANCH OF LEFT CORONARY ARTERY: Chronic | Status: ACTIVE | Noted: 2023-06-02

## 2023-06-02 PROBLEM — I25.10 CORONARY ARTERY DISEASE DUE TO LIPID RICH PLAQUE: Chronic | Status: ACTIVE | Noted: 2023-06-02

## 2023-06-02 PROBLEM — I25.83 CORONARY ARTERY DISEASE DUE TO LIPID RICH PLAQUE: Chronic | Status: ACTIVE | Noted: 2023-06-02

## 2023-06-02 LAB
ACT BLD: 239 SEC (ref 74–137)
ACT BLD: 323 SEC (ref 74–137)
ACT BLD: 323 SEC (ref 74–137)
ALBUMIN SERPL BCP-MCNC: 4.2 G/DL (ref 3.2–4.9)
ALBUMIN/GLOB SERPL: 1.9 G/DL
ALP SERPL-CCNC: 84 U/L (ref 30–99)
ALT SERPL-CCNC: 20 U/L (ref 2–50)
ANION GAP SERPL CALC-SCNC: 12 MMOL/L (ref 7–16)
APTT PPP: 26.2 SEC (ref 24.7–36)
AST SERPL-CCNC: 15 U/L (ref 12–45)
BILIRUB SERPL-MCNC: 0.5 MG/DL (ref 0.1–1.5)
BUN SERPL-MCNC: 20 MG/DL (ref 8–22)
CALCIUM ALBUM COR SERPL-MCNC: 8.9 MG/DL (ref 8.5–10.5)
CALCIUM SERPL-MCNC: 9.1 MG/DL (ref 8.5–10.5)
CHLORIDE SERPL-SCNC: 108 MMOL/L (ref 96–112)
CO2 SERPL-SCNC: 24 MMOL/L (ref 20–33)
CREAT SERPL-MCNC: 0.85 MG/DL (ref 0.5–1.4)
EKG IMPRESSION: NORMAL
EKG IMPRESSION: NORMAL
ERYTHROCYTE [DISTWIDTH] IN BLOOD BY AUTOMATED COUNT: 44.9 FL (ref 35.9–50)
GFR SERPLBLD CREATININE-BSD FMLA CKD-EPI: 100 ML/MIN/1.73 M 2
GLOBULIN SER CALC-MCNC: 2.2 G/DL (ref 1.9–3.5)
GLUCOSE SERPL-MCNC: 114 MG/DL (ref 65–99)
HCT VFR BLD AUTO: 42.5 % (ref 42–52)
HGB BLD-MCNC: 14.3 G/DL (ref 14–18)
INR PPP: 1.03 (ref 0.87–1.13)
MCH RBC QN AUTO: 30.9 PG (ref 27–33)
MCHC RBC AUTO-ENTMCNC: 33.6 G/DL (ref 32.3–36.5)
MCV RBC AUTO: 91.8 FL (ref 81.4–97.8)
PLATELET # BLD AUTO: 162 K/UL (ref 164–446)
PMV BLD AUTO: 9.4 FL (ref 9–12.9)
POTASSIUM SERPL-SCNC: 4.6 MMOL/L (ref 3.6–5.5)
PROT SERPL-MCNC: 6.4 G/DL (ref 6–8.2)
PROTHROMBIN TIME: 13.4 SEC (ref 12–14.6)
RBC # BLD AUTO: 4.63 M/UL (ref 4.7–6.1)
SODIUM SERPL-SCNC: 144 MMOL/L (ref 135–145)
WBC # BLD AUTO: 7.1 K/UL (ref 4.8–10.8)

## 2023-06-02 PROCEDURE — 85610 PROTHROMBIN TIME: CPT

## 2023-06-02 PROCEDURE — 99153 MOD SED SAME PHYS/QHP EA: CPT

## 2023-06-02 PROCEDURE — 160036 HCHG PACU - EA ADDL 30 MINS PHASE I

## 2023-06-02 PROCEDURE — 700117 HCHG RX CONTRAST REV CODE 255: Performed by: INTERNAL MEDICINE

## 2023-06-02 PROCEDURE — 80053 COMPREHEN METABOLIC PANEL: CPT

## 2023-06-02 PROCEDURE — 85347 COAGULATION TIME ACTIVATED: CPT

## 2023-06-02 PROCEDURE — 36415 COLL VENOUS BLD VENIPUNCTURE: CPT

## 2023-06-02 PROCEDURE — 93010 ELECTROCARDIOGRAM REPORT: CPT | Mod: 59 | Performed by: INTERNAL MEDICINE

## 2023-06-02 PROCEDURE — 85730 THROMBOPLASTIN TIME PARTIAL: CPT

## 2023-06-02 PROCEDURE — 160046 HCHG PACU - 1ST 60 MINS PHASE II

## 2023-06-02 PROCEDURE — 700101 HCHG RX REV CODE 250

## 2023-06-02 PROCEDURE — A9270 NON-COVERED ITEM OR SERVICE: HCPCS

## 2023-06-02 PROCEDURE — 85027 COMPLETE CBC AUTOMATED: CPT

## 2023-06-02 PROCEDURE — 93005 ELECTROCARDIOGRAM TRACING: CPT | Performed by: INTERNAL MEDICINE

## 2023-06-02 PROCEDURE — 700102 HCHG RX REV CODE 250 W/ 637 OVERRIDE(OP)

## 2023-06-02 PROCEDURE — 92928 PRQ TCAT PLMT NTRAC ST 1 LES: CPT | Mod: LD | Performed by: INTERNAL MEDICINE

## 2023-06-02 PROCEDURE — 160035 HCHG PACU - 1ST 60 MINS PHASE I

## 2023-06-02 PROCEDURE — 99152 MOD SED SAME PHYS/QHP 5/>YRS: CPT | Performed by: INTERNAL MEDICINE

## 2023-06-02 PROCEDURE — 700111 HCHG RX REV CODE 636 W/ 250 OVERRIDE (IP)

## 2023-06-02 PROCEDURE — 700105 HCHG RX REV CODE 258: Performed by: INTERNAL MEDICINE

## 2023-06-02 PROCEDURE — 92978 ENDOLUMINL IVUS OCT C 1ST: CPT | Mod: 26,LD | Performed by: INTERNAL MEDICINE

## 2023-06-02 PROCEDURE — 93010 ELECTROCARDIOGRAM REPORT: CPT | Mod: 59,76 | Performed by: INTERNAL MEDICINE

## 2023-06-02 PROCEDURE — 160002 HCHG RECOVERY MINUTES (STAT)

## 2023-06-02 PROCEDURE — RXMED WILLOW AMBULATORY MEDICATION CHARGE: Performed by: NURSE PRACTITIONER

## 2023-06-02 PROCEDURE — 93458 L HRT ARTERY/VENTRICLE ANGIO: CPT | Mod: 26,59 | Performed by: INTERNAL MEDICINE

## 2023-06-02 RX ORDER — ASPIRIN 81 MG/1
TABLET, CHEWABLE ORAL
Status: COMPLETED
Start: 2023-06-02 | End: 2023-06-02

## 2023-06-02 RX ORDER — HEPARIN SODIUM 1000 [USP'U]/ML
INJECTION, SOLUTION INTRAVENOUS; SUBCUTANEOUS
Status: COMPLETED
Start: 2023-06-02 | End: 2023-06-02

## 2023-06-02 RX ORDER — VERAPAMIL HYDROCHLORIDE 2.5 MG/ML
INJECTION, SOLUTION INTRAVENOUS
Status: COMPLETED
Start: 2023-06-02 | End: 2023-06-02

## 2023-06-02 RX ORDER — MIDAZOLAM HYDROCHLORIDE 1 MG/ML
INJECTION INTRAMUSCULAR; INTRAVENOUS
Status: COMPLETED
Start: 2023-06-02 | End: 2023-06-02

## 2023-06-02 RX ORDER — CLOPIDOGREL BISULFATE 75 MG/1
75 TABLET ORAL DAILY
Status: DISCONTINUED | OUTPATIENT
Start: 2023-06-03 | End: 2023-06-02 | Stop reason: HOSPADM

## 2023-06-02 RX ORDER — ASPIRIN 81 MG/1
81 TABLET, CHEWABLE ORAL DAILY
Qty: 100 TABLET | Refills: 3 | Status: SHIPPED | OUTPATIENT
Start: 2023-06-02

## 2023-06-02 RX ORDER — ASPIRIN 81 MG/1
81 TABLET ORAL DAILY
Status: DISCONTINUED | OUTPATIENT
Start: 2023-06-02 | End: 2023-06-02 | Stop reason: HOSPADM

## 2023-06-02 RX ORDER — LIDOCAINE HYDROCHLORIDE 20 MG/ML
INJECTION, SOLUTION INFILTRATION; PERINEURAL
Status: COMPLETED
Start: 2023-06-02 | End: 2023-06-02

## 2023-06-02 RX ORDER — SODIUM CHLORIDE 9 MG/ML
1000 INJECTION, SOLUTION INTRAVENOUS
Status: COMPLETED | OUTPATIENT
Start: 2023-06-02 | End: 2023-06-02

## 2023-06-02 RX ORDER — SODIUM CHLORIDE 9 MG/ML
INJECTION, SOLUTION INTRAVENOUS CONTINUOUS
Status: DISCONTINUED | OUTPATIENT
Start: 2023-06-02 | End: 2023-06-02 | Stop reason: HOSPADM

## 2023-06-02 RX ORDER — HEPARIN SODIUM 200 [USP'U]/100ML
INJECTION, SOLUTION INTRAVENOUS
Status: COMPLETED
Start: 2023-06-02 | End: 2023-06-02

## 2023-06-02 RX ORDER — CLOPIDOGREL 300 MG/1
TABLET, FILM COATED ORAL
Status: COMPLETED
Start: 2023-06-02 | End: 2023-06-02

## 2023-06-02 RX ORDER — ATORVASTATIN CALCIUM 40 MG/1
40 TABLET, FILM COATED ORAL NIGHTLY
Qty: 100 TABLET | Refills: 3 | Status: SHIPPED | OUTPATIENT
Start: 2023-06-02 | End: 2023-07-14 | Stop reason: SDUPTHER

## 2023-06-02 RX ORDER — CLOPIDOGREL 300 MG/1
600 TABLET, FILM COATED ORAL ONCE
Status: DISCONTINUED | OUTPATIENT
Start: 2023-06-02 | End: 2023-06-02

## 2023-06-02 RX ORDER — CLOPIDOGREL BISULFATE 75 MG/1
75 TABLET ORAL DAILY
Qty: 100 TABLET | Refills: 3 | Status: SHIPPED | OUTPATIENT
Start: 2023-06-02 | End: 2023-07-14 | Stop reason: SDUPTHER

## 2023-06-02 RX ADMIN — MIDAZOLAM HYDROCHLORIDE 2 MG: 2 INJECTION, SOLUTION INTRAMUSCULAR; INTRAVENOUS at 09:32

## 2023-06-02 RX ADMIN — HEPARIN SODIUM 2000 UNITS: 200 INJECTION, SOLUTION INTRAVENOUS at 08:38

## 2023-06-02 RX ADMIN — ASPIRIN 81 MG 324 MG: 81 TABLET ORAL at 08:42

## 2023-06-02 RX ADMIN — HEPARIN SODIUM: 1000 INJECTION, SOLUTION INTRAVENOUS; SUBCUTANEOUS at 08:37

## 2023-06-02 RX ADMIN — VERAPAMIL HYDROCHLORIDE 5 MG: 2.5 INJECTION, SOLUTION INTRAVENOUS at 08:37

## 2023-06-02 RX ADMIN — MIDAZOLAM HYDROCHLORIDE 2 MG: 2 INJECTION, SOLUTION INTRAMUSCULAR; INTRAVENOUS at 09:07

## 2023-06-02 RX ADMIN — LIDOCAINE HYDROCHLORIDE: 20 INJECTION, SOLUTION INFILTRATION; PERINEURAL at 08:37

## 2023-06-02 RX ADMIN — CLOPIDOGREL BISULFATE 600 MG: 300 TABLET, FILM COATED ORAL at 09:41

## 2023-06-02 RX ADMIN — IOHEXOL 90 ML: 350 INJECTION, SOLUTION INTRAVENOUS at 09:41

## 2023-06-02 RX ADMIN — FENTANYL CITRATE 100 MCG: 50 INJECTION, SOLUTION INTRAMUSCULAR; INTRAVENOUS at 09:32

## 2023-06-02 RX ADMIN — FENTANYL CITRATE 100 MCG: 50 INJECTION, SOLUTION INTRAMUSCULAR; INTRAVENOUS at 09:07

## 2023-06-02 RX ADMIN — HEPARIN SODIUM: 1000 INJECTION, SOLUTION INTRAVENOUS; SUBCUTANEOUS at 09:12

## 2023-06-02 RX ADMIN — NITROGLYCERIN 10 ML: 20 INJECTION INTRAVENOUS at 08:37

## 2023-06-02 RX ADMIN — SODIUM CHLORIDE 1000 ML: 9 INJECTION, SOLUTION INTRAVENOUS at 07:47

## 2023-06-02 ASSESSMENT — PAIN DESCRIPTION - PAIN TYPE: TYPE: SURGICAL PAIN

## 2023-06-02 ASSESSMENT — FIBROSIS 4 INDEX: FIB4 SCORE: 0.87

## 2023-06-02 NOTE — OR NURSING
Arrived from PACU  Pt is A&Ox4, VSS; denies N/V; states pain is at tolerable level. Dressing CDI to right wrist; gauze and tegaderm; no hematoma or active bleeding noted.     D/c orders received. IV dc'd.   Pt changed into clothing with assistance.   Discharge reviewed  Pt and family verbalized understanding and questions answered.   Patient states ready to d/c home.   Pt dc'd in w/c with family.

## 2023-06-02 NOTE — PROCEDURES
"CARDIAC CATHETERIZATION REPORT    Referring Provider: Javi Downey M.D.    PROCEDURE PHYSICIAN: Conor Salas MD, [facility], [facility]  ASSISTANT: None    IMPRESSIONS:  1.  Class III angina due to occluded proximal LAD  2.  Successful PCI of the LAD total occlusion using 2 drug-eluting stents, IVUS guidance  3.  Mild elevation LVEDP at 20 mmHg    Recommendations:  Dual anti-platelet therapy for 6 months  Statin therapy  Continue beta-blockade  Stop propafenone, start Multaq -> will notify EP    Pre-procedure diagnosis:  Class III angina, unstable pattern  Post-procedure diagnosis: Same    Procedure performed  Selective coronary angiography  Left heart catheterization  Percutaneous coronary intervention - Stent Placement (LAD)  Intravascular Ultrasound (LAD)    Conscious sedation was supervised by myself and administered by trained personnel using fentanyl and versed between 849 and 942. The patient tolerated sedation without complication.     Procedure Description  1. Access: 5/6 Italian right radial artery Micropuncture technique was utilized following local anesthesia with lidocaine.  A radial cocktail containing verapamil and saline was administered in the radial artery sheath    2. Diagnostic description: The catheter was passed to the central circulation with the aide of J tipped 0.35\" wire. A 5F TIG 4.0 was used to inject the coronary circulation  and enter the left ventricle during invasive hemodynamic monitoring.     3. Description of Intervention: After confirming therapeutic anticoagulation intervention proceeded. A 6F EBU 3.5 guiding catheter was used. The lesion in the LAD was crossed with a 0.014\"  200 wire.  The lesion was dilated with a 2.0 x 15 mm compliant balloon.  Which successfully restored flow into the vessel.  I then passed an IVUS catheter which demonstrated 4 mm reference vessel size in the proximal LAD and 3.5 in the mid LAD just after the diagonal branch.  There is bridging just distal " to this segment and a 3.0 millimeter lumen at this point.  I then performed further dilation with a 4.0 NC balloon proximally and a 3.5 NC balloon in the midportion of the vessel.  I then deployed a 3.5 x 24 mm Synergy XD ARRON at nominal atmospheres and then more proximally a 4.0 x 28 mm Synergy XD ARRON extending to the ostium of the LAD.  Repeat IVUS was performed which demonstrated excellent stent sizing distally, complete expansion but further opportunity for stent expansion in the proximal portion.  I then returned with a 4.5 x 15 NC balloon and dilated through the first diagonal branch.  Subsequent angiography demonstrated excellent result    4. Closing: At completion of the procedure the relevant equipment was removed from the body and hemostasis achieved by Radial band    Findings   Hemodynamics:   Aorta: 129/76 mmHg  LVEDP: 20 mmHg  No significant pullback gradient across the aortic valve    Coronary Anatomy   Left Main:  Distal 10% stenosis   LAD:  100% occluded at the origin   Ramus: 30% mid stenosis   LCx: Minimal luminal irregularities.  Supplies 2 moderate size OM vessels   RCA: Dominant, 30% mid stenosis.  The PDA and PLB have minimal luminal irregularities.      Results of intervention to the LAD:  Pre: 100% stenosis and EDWAR I flow  Post: 0% residual stenosis and EDWAR III flow. No dissection or distal embolization.    Technical Factors  1. Complications: None  2. Estimated Blood Loss: <50 cc  3. Specimens: None  4. Contrast Volume: 90 ml  5. Medications: Radial cocktail (Verapamil 2.5 mg, Nitroglycerin 100 mcg) Heparin to maintain ACT >250 Clopidogrel 600 mg Intracoronary nitroglycerin  6. Radiation (Air Kerma): 610 mGy

## 2023-06-02 NOTE — DISCHARGE INSTRUCTIONS
HOME CARE INSTRUCTIONS    ACTIVITY: Rest and take it easy for the first 24 hours.  A responsible adult is recommended to remain with you during that time.  It is normal to feel sleepy.  We encourage you to not do anything that requires balance, judgment or coordination.    FOR 24 HOURS DO NOT:  Drive, operate machinery or run household appliances.  Drink beer or alcoholic beverages.  Make important decisions or sign legal documents.    SPECIAL INSTRUCTIONS:   POST ANGIOGRAM  General Care Instructions  Maintain a bandage over the incision site for 24 hours.  It's normal to find a small bruise or dime-sized lump at the insertion site. This should disappear within a few weeks.  Do not apply lotions or powders to the site.  Do not immerse the catheter insertion site in water (bathtub/swimming) for five days. It is ok to shower 24 hours after the procedure.  You may resume your normal diet immediately; on the day of your procedure, drink 6-10 glasses of water to help flush the contrast liquid out of your system.  If the doctor inserted the catheter in through your groin:  Walking short distances on a flat surface is OK. Limit going up/down stairs for the first 2 days.  DO NOT do yard work, drive, squat, lift heavy objects, or play sports for 2 days; or until your health care provider tells you it is OK.  If the doctor inserted the catheter in your arm:  For 48 hours, DO NOT lift anything heavier than 10 pounds (approximately a gallon of milk). DO NOT do any heavy pushing, pulling, or twisting.    Medications  If your current medications need to be changed, you will be provided with an updated list of your medications prior to discharge.  If you take warfarin (Coumadin), resume taking your usual dose the evening after the procedure.  DO NOT STOP taking prescribed blood thinning (anti-platelet) medications unless instructed by your cardiologist.  These medications include:  Aspirin, Clopidogrel (Plavix), Ticagrelor  (Brilinta), or Prasugrel (Effient)   If you take one of the following anticoagulants, RESUME 24 HOURS after your procedure:  Apixiban (Eliquis), Rivaroxaban (Xarelto), Dabigatran (Pradaxa), Edoxaban (Savaysa)  If you take metformin (Glucophage), RESUME 48 HOURS after your procedure.    When to call your healthcare provider  Call your cardiologist right away at [phone] if you have any of the following:   Problems/Concerns taking any of your prescribed heart medicines.   The insertion site has increasing pain, swelling, redness, bleeding, or drainage.   Your arm or leg below where the insertion site changes color, is cool, or is numb.   You have chest pain or shortness of breath that does not go away with rest.   Your pulse feels irregular -- very slow (less than 60 beats/minute) or very fast (over 100 beats/minute).   You have dizziness, fainting, or you are very tired.   You are coughing up blood or yellow or green mucus.   You have chills or a fever over 101°F (38.3°C).    If there is bleeding at the catheter insertion site, apply pressure for 10 minutes.  If bleeding persists, call 911, and continue to hold pressure until advanced medical support arrives.      If your doctor has been urging you to quit smoking, it's for good reasons. Smoking damages your heart, blood vessels, and lungs. The good news is that quitting can halt or even reverse the damage of smoking. To quit now:  Get medical help. Ask your doctor for advice on stop-smoking programs. Also ask about medications or nicotine replacement therapy products that may help you quit smoking.  Get support. Join a support group. Ask for help from your family and friends.  Don't give up. It often takes several tries to succeed in quitting smoking.  Avoid secondhand smoke. Ask family and friends not to smoke around you.     DIET: To avoid nausea, slowly advance diet as tolerated, avoiding spicy or greasy foods for the first day.  Add more substantial food to  your diet according to your physician's instructions.  Babies can be fed formula or breast milk as soon as they are hungry.  INCREASE FLUIDS AND FIBER TO AVOID CONSTIPATION.    SURGICAL DRESSING/BATHING: Keep on for 24 hours, do not submerge until cleared by physician.     MEDICATIONS: Resume taking daily medication.  Take prescribed pain medication with food.  If no medication is prescribed, you may take non-aspirin pain medication if needed.  PAIN MEDICATION CAN BE VERY CONSTIPATING.  Take a stool softener or laxative such as senokot, pericolace, or milk of magnesia if needed.    A follow-up appointment should be arranged with your doctor in 1-2 weeks; call to schedule.    You should CALL YOUR PHYSICIAN if you develop:  Fever greater than 101 degrees F.  Pain not relieved by medication, or persistent nausea or vomiting.  Excessive bleeding (blood soaking through dressing) or unexpected drainage from the wound.  Extreme redness or swelling around the incision site, drainage of pus or foul smelling drainage.  Inability to urinate or empty your bladder within 8 hours.  Problems with breathing or chest pain.    You should call 911 if you develop problems with breathing or chest pain.  If you are unable to contact your doctor or surgical center, you should go to the nearest emergency room or urgent care center.  Physician's telephone #: [phone]    MILD FLU-LIKE SYMPTOMS ARE NORMAL.  YOU MAY EXPERIENCE GENERALIZED MUSCLE ACHES, THROAT IRRITATION, HEADACHE AND/OR SOME NAUSEA.    If any questions arise, call your doctor.  If your doctor is not available, please feel free to call the Surgical Center at [phone].  The Center is open Monday through Friday from 7AM to 7PM.      A registered nurse may call you a few days after your surgery to see how you are doing after your procedure.    You may also receive a survey in the mail within the next two weeks and we ask that you take a few moments to complete the survey  and return it to us.  Our goal is to provide you with very good care and we value your comments.     Depression / Suicide Risk    As you are discharged from this [facility] facility, it is important to learn how to keep safe from harming yourself.    Recognize the warning signs:  Abrupt changes in personality, positive or negative- including increase in energy   Giving away possessions  Change in eating patterns- significant weight changes-  positive or negative  Change in sleeping patterns- unable to sleep or sleeping all the time   Unwillingness or inability to communicate  Depression  Unusual sadness, discouragement and loneliness  Talk of wanting to die  Neglect of personal appearance   Rebelliousness- reckless behavior  Withdrawal from people/activities they love  Confusion- inability to concentrate     If you or a loved one observes any of these behaviors or has concerns about self-harm, here's what you can do:  Talk about it- your feelings and reasons for harming yourself  Remove any means that you might use to hurt yourself (examples: pills, rope, extension cords, firearm)  Get professional help from the community (Mental Health, Substance Abuse, psychological counseling)  Do not be alone:Call your Safe Contact- someone whom you trust who will be there for you.  Call your local CRISIS HOTLINE 779-9641 or 399-338-4318  Call your local Children's Mobile Crisis Response Team [place] (731) 136-5515 or www.FireID  Call the toll free National Suicide Prevention Hotlines   National Suicide Prevention Lifeline 973-428-AKVZ (5249)  National Hope Line Network 800-SUICIDE (397-9634)    I acknowledge receipt and understanding of these Home Care instructions.

## 2023-06-02 NOTE — TELEPHONE ENCOUNTER
Recvd call from [facility], per Dr. Salas this patient needs to see Dr. Pickett. He was switched to Multaq and can not afford it. He was only give 30 days.     LM on patient's voicemail requesting a call back to schedule an appointment with Dr. Pickett.

## 2023-06-02 NOTE — OR NURSING
Pt arrives from Cath Lab to PACU at 0951. Pt identification verified by team, pt placed on all monitors with alarms audible, report and care of pt received from RN. Assessment completed, pt changed into hospital gown and provided with warm blankets.     1025- Call placed to [facility] pharmacy to ensure pt medications will be able to be filled. Also requested a cost estimate. The Multaq will be $736.00 for a 30 day supply. Message sent to STEFANI Rizvi for Dr. Salas, to notify her of the exorbitant cost of the medication.     1100- Spoke with pharmacist at [facility] who was able to find a coupon for the Multaq bringing the cost to $2.12. Medication will be provided through [id number]. Call placed to pt spouse Mariella with update.

## 2023-06-03 NOTE — TELEPHONE ENCOUNTER
Called pt and his Mr Leone PA-C    I reinforced DAPT, I  would recommend lifelong but at least a year.We specifically discussed and he understands the importance of antiplatelet therapy as well as the risk.    Would increase statin to 80 mg if tolerated     Reinforced switch from Rhythmol to Multaq and follow up with EP            It is my pleasure to participate in the care of Mr. Griffiths.  Please do not hesitate to contact me with questions or concerns.    Javi Downey MD PhD [facility]  Cardiologist [facility]    Please note that this dictation was created using voice recognition software. There may be errors I did not discover before finalizing the note.     6/2/2023  5:22 PM

## 2023-06-05 ENCOUNTER — DOCUMENTATION (OUTPATIENT)
Dept: CARDIOLOGY | Facility: MEDICAL CENTER | Age: 58
End: 2023-06-05
Payer: COMMERCIAL

## 2023-06-05 NOTE — PROGRESS NOTES
Michael Pickett M.D.  Conor Salas M.D.; Javi Downey M.D.; Mimi Cabrera R.N.  Thanks. Good call with the medication switch. Looks like I'm seeing him in a couple weeks so I'll go over it with him.           Previous Messages       ----- Message -----   From: Conor Salas M.D.   Sent: 6/2/2023  10:04 AM PDT   To: Michael Pickett M.D.; *   Subject: Inpatient Notes                                   PCI LAD today. On Rythmol for SVT. Stopped Rythmol and started multaq.     Thx   BE

## 2023-06-14 ENCOUNTER — OFFICE VISIT (OUTPATIENT)
Dept: CARDIOLOGY | Facility: MEDICAL CENTER | Age: 58
End: 2023-06-14
Attending: INTERNAL MEDICINE
Payer: COMMERCIAL

## 2023-06-14 VITALS
DIASTOLIC BLOOD PRESSURE: 86 MMHG | WEIGHT: 244 LBS | OXYGEN SATURATION: 95 % | HEART RATE: 54 BPM | HEIGHT: 73 IN | RESPIRATION RATE: 14 BRPM | SYSTOLIC BLOOD PRESSURE: 120 MMHG | BODY MASS INDEX: 32.34 KG/M2

## 2023-06-14 DIAGNOSIS — I47.10 SVT (SUPRAVENTRICULAR TACHYCARDIA) (HCC): ICD-10-CM

## 2023-06-14 PROCEDURE — 3079F DIAST BP 80-89 MM HG: CPT | Performed by: INTERNAL MEDICINE

## 2023-06-14 PROCEDURE — 93005 ELECTROCARDIOGRAM TRACING: CPT | Performed by: INTERNAL MEDICINE

## 2023-06-14 PROCEDURE — 93010 ELECTROCARDIOGRAM REPORT: CPT | Performed by: INTERNAL MEDICINE

## 2023-06-14 PROCEDURE — 3074F SYST BP LT 130 MM HG: CPT | Performed by: INTERNAL MEDICINE

## 2023-06-14 PROCEDURE — 99214 OFFICE O/P EST MOD 30 MIN: CPT | Performed by: INTERNAL MEDICINE

## 2023-06-14 PROCEDURE — 99212 OFFICE O/P EST SF 10 MIN: CPT | Performed by: INTERNAL MEDICINE

## 2023-06-14 ASSESSMENT — FIBROSIS 4 INDEX: FIB4 SCORE: 1.22

## 2023-06-14 NOTE — PROGRESS NOTES
Arrhythmia Clinic Note (Established patient)    DOS: 6/14/2023    Chief complaint/Reason for consult: Atach    Interval History: 56 y/o M with AT prior managed on Rhythmol, recent LAD PCI for  much improved symptoms now. Taken of Rhythmol and now on Multaq. No AT.     ROS (+ highlighted in bold):  Constitutional: Fevers/chills/fatigue/weightloss  HEENT: Blurry vision/eye pain/sore throat/hearing loss  Respiratory: Shortness of breath/cough  Cardiovascular: Chest pain/palpitations/edema/orthopnea/syncope  GI: Nausea/vomitting/diarrhea  MSK: Arthralgias/myagias/muscle weakness  Skin: Rash/sores  Neurological: Numbness/tremors/vertigo  Endocrine: Excessive thirst/polyuria/cold intolerance/heat intolerance  Psych: Depression/anxiety    Past Medical History:   Diagnosis Date    Arrhythmia 11/17/2021    SVT    Back pain 1986    Degen Disc. Pinched nerve since 3-21    Coronary artery disease due to lipid rich plaque 6/2/2023    Dyslipidemia     High risk medication use - antiarrhythmic     Hypertriglyceridemia     Indigestion 11/17/2021    occ    ENRICO on CPAP     Pain 11/17/2021    L5-S1 disc    Presence of stent in anterior descending branch of left coronary artery - 2 ARRON with LAD  6/2/2023    Sleep apnea        Past Surgical History:   Procedure Laterality Date    LUMBAR FUSION O-ARM N/A 12/01/2022    Procedure: STAGE#2 LUMBAR 5- SACRAL 1 PERCUTANEOUS FUSION WITH O-ARM;  Surgeon: Jeremy Treviño M.D.;  Location: SURGERY [facility];  Service: Neurosurgery    LUMBAR FUSION ANTERIOR N/A 11/30/2022    Procedure: STAGE#1 ANTERIOR LUMBAR 5-SACRAL 1 INTERBODY FUSION;  Surgeon: Jeremy Treviño M.D.;  Location: SURGERY [facility];  Service: Neurosurgery    CARDIOVASCULAR  12-3-21    Ablation for SVT    OTHER CARDIAC SURGERY      ablation for SVT    OTHER ORTHOPEDIC SURGERY      right knee    SKELETAL  9-1987 / 12-22    Knee Surgery - ligaments and cartilage / Back Surgery fusion L5-S1       Social History  "    Socioeconomic History    Marital status:      Spouse name: Not on file    Number of children: Not on file    Years of education: Not on file    Highest education level: Not on file   Occupational History    Not on file   Tobacco Use    Smoking status: Never    Smokeless tobacco: Never   Vaping Use    Vaping Use: Never used   Substance and Sexual Activity    Alcohol use: Yes     Comment: 3-4 drinks/week    Drug use: Never    Sexual activity: Not on file   Other Topics Concern    Not on file   Social History Narrative    Not on file     Social Determinants of Health     Financial Resource Strain: Not on file   Food Insecurity: Not on file   Transportation Needs: Not on file   Physical Activity: Not on file   Stress: Not on file   Social Connections: Not on file   Intimate Partner Violence: Not on file   Housing Stability: Not on file       Family History   Problem Relation Age of Onset    Cancer Maternal Grandmother         Brain Cancer    Lung Disease Maternal Grandfather         Smoker and  by trade during the war    Heart Disease Neg Hx        No Known Allergies    Current Outpatient Medications   Medication Sig Dispense Refill    aspirin (ASA) 81 MG Chew Tab chewable tablet Chew 1 Tablet every day. 100 Tablet 3    clopidogrel (PLAVIX) 75 MG Tab Take 1 Tablet by mouth every day. 100 Tablet 3    atorvastatin (LIPITOR) 40 MG Tab Take 1 Tablet by mouth every evening. 100 Tablet 3    dronedarone (MULTAQ) 400 MG Tab Take 1 Tablet by mouth 2 times a day with meals. 200 Tablet 3    metoprolol SR (TOPROL XL) 25 MG TABLET SR 24 HR Take 1 Tablet by mouth 2 times a day. 180 Tablet 3     No current facility-administered medications for this visit.       Physical Exam:  Vitals:    06/14/23 0848   BP: 120/86   BP Location: Left arm   Patient Position: Sitting   BP Cuff Size: Adult   Pulse: (!) 54   Resp: 14   SpO2: 95%   Weight: 111 kg (244 lb)   Height: 1.854 m (6' 1\")     General appearance: NAD, conversant "   Eyes: anicteric sclerae, moist conjunctivae; no lid-lag; PERRLA  HENT: Atraumatic; oropharynx clear with moist mucous membranes and no mucosal ulcerations; normal hard and soft palate  Neck: Trachea midline; FROM, supple, no thyromegaly or lymphadenopathy  Lungs: CTA, with normal respiratory effort and no intercostal retractions  CV: RRR, no MRGs, no JVD  Abdomen: Soft, non-tender; no masses or HSM  Extremities: No peripheral edema or extremity lymphadenopathy  Skin: Normal temperature, turgor and texture; no rash, ulcers or subcutaneous nodules  Psych: Appropriate affect, alert and oriented to person, place and time    Data:  Lipids:   Lab Results   Component Value Date/Time    CHOLSTRLTOT 197 12/07/2021 01:20 AM    TRIGLYCERIDE 253 (H) 12/07/2021 01:20 AM    HDL 35 (A) 12/07/2021 01:20 AM     (H) 12/07/2021 01:20 AM        BMP:  Lab Results   Component Value Date/Time    SODIUM 144 06/02/2023 0730    POTASSIUM 4.6 06/02/2023 0730    CHLORIDE 108 06/02/2023 0730    CO2 24 06/02/2023 0730    GLUCOSE 114 (H) 06/02/2023 0730    BUN 20 06/02/2023 0730    CREATININE 0.85 06/02/2023 0730    CALCIUM 9.1 06/02/2023 0730    ANION 12.0 06/02/2023 0730        TSH:   Lab Results   Component Value Date/Time    TSHULTRASEN 4.640 12/06/2021 0128        THYROXINE (T4):   No results found for: GUNNAR     CBC:   Lab Results   Component Value Date/Time    WBC 7.1 06/02/2023 07:30 AM    RBC 4.63 (L) 06/02/2023 07:30 AM    HEMOGLOBIN 14.3 06/02/2023 07:30 AM    HEMATOCRIT 42.5 06/02/2023 07:30 AM    MCV 91.8 06/02/2023 07:30 AM    MCH 30.9 06/02/2023 07:30 AM    MCHC 33.6 06/02/2023 07:30 AM    RDW 44.9 06/02/2023 07:30 AM    PLATELETCT 162 (L) 06/02/2023 07:30 AM    MPV 9.4 06/02/2023 07:30 AM    NEUTSPOLYS 61.30 11/11/2022 08:35 AM    LYMPHOCYTES 30.60 11/11/2022 08:35 AM    MONOCYTES 6.30 11/11/2022 08:35 AM    EOSINOPHILS 1.10 11/11/2022 08:35 AM    BASOPHILS 0.40 11/11/2022 08:35 AM    IMMGRAN 0.30 11/11/2022 08:35 AM     NRBC 0.00 11/11/2022 08:35 AM    NEUTS 4.29 11/11/2022 08:35 AM    LYMPHS 2.14 11/11/2022 08:35 AM    MONOS 0.44 11/11/2022 08:35 AM    EOS 0.08 11/11/2022 08:35 AM    BASO 0.03 11/11/2022 08:35 AM    IMMGRANAB 0.02 11/11/2022 08:35 AM    NRBCAB 0.00 11/11/2022 08:35 AM        CBC w/o DIFF  Lab Results   Component Value Date/Time    WBC 7.1 06/02/2023 07:30 AM    RBC 4.63 (L) 06/02/2023 07:30 AM    HEMOGLOBIN 14.3 06/02/2023 07:30 AM    MCV 91.8 06/02/2023 07:30 AM    MCH 30.9 06/02/2023 07:30 AM    MCHC 33.6 06/02/2023 07:30 AM    RDW 44.9 06/02/2023 07:30 AM    MPV 9.4 06/02/2023 07:30 AM       Prior echo/stress reviewed: EF 60%    Prior cath reviewed: LAD PCI    EKG interpreted by me: NSR    Impression/Plan:  1. SVT (supraventricular tachycardia) (HCC)  EKG        SVT  High risk medication use  CAD s/p PCI    - Continue Multaq. ECG reviewed  - Cardiology followup for CAD, PCI    APN followup in 6 months    Michael Pickett MD  Cardiac Electrophysiology

## 2023-06-26 DIAGNOSIS — I20.0 UNSTABLE ANGINA (HCC): ICD-10-CM

## 2023-07-14 LAB — EKG IMPRESSION: NORMAL

## 2023-08-03 DIAGNOSIS — I25.10 CORONARY ARTERY DISEASE DUE TO LIPID RICH PLAQUE: ICD-10-CM

## 2023-08-03 DIAGNOSIS — I25.83 CORONARY ARTERY DISEASE DUE TO LIPID RICH PLAQUE: ICD-10-CM

## 2023-08-28 DIAGNOSIS — I25.83 CORONARY ARTERY DISEASE DUE TO LIPID RICH PLAQUE: ICD-10-CM

## 2023-08-28 DIAGNOSIS — I25.10 CORONARY ARTERY DISEASE DUE TO LIPID RICH PLAQUE: ICD-10-CM

## 2023-10-19 DIAGNOSIS — I20.0 UNSTABLE ANGINA (HCC): ICD-10-CM

## 2023-11-21 ENCOUNTER — TELEPHONE (OUTPATIENT)
Dept: CARDIOLOGY | Facility: MEDICAL CENTER | Age: 58
End: 2023-11-21
Payer: COMMERCIAL

## 2023-11-21 DIAGNOSIS — E78.5 DYSLIPIDEMIA: Chronic | ICD-10-CM

## 2023-11-21 RX ORDER — EZETIMIBE 10 MG/1
10 TABLET ORAL DAILY
Qty: 90 TABLET | Refills: 3 | Status: SHIPPED | OUTPATIENT
Start: 2023-11-21

## 2023-11-22 ENCOUNTER — TELEPHONE (OUTPATIENT)
Dept: CARDIOLOGY | Facility: MEDICAL CENTER | Age: 58
End: 2023-11-22
Payer: COMMERCIAL

## 2023-12-12 ENCOUNTER — TELEPHONE (OUTPATIENT)
Dept: PHARMACY | Facility: MEDICAL CENTER | Age: 58
End: 2023-12-12

## 2023-12-12 ENCOUNTER — OFFICE VISIT (OUTPATIENT)
Dept: CARDIOLOGY | Facility: MEDICAL CENTER | Age: 58
End: 2023-12-12
Attending: NURSE PRACTITIONER
Payer: COMMERCIAL

## 2023-12-12 VITALS
HEIGHT: 73 IN | BODY MASS INDEX: 33.27 KG/M2 | SYSTOLIC BLOOD PRESSURE: 124 MMHG | HEART RATE: 52 BPM | WEIGHT: 251 LBS | RESPIRATION RATE: 16 BRPM | DIASTOLIC BLOOD PRESSURE: 82 MMHG | OXYGEN SATURATION: 98 %

## 2023-12-12 DIAGNOSIS — I47.10 SVT (SUPRAVENTRICULAR TACHYCARDIA) (HCC): ICD-10-CM

## 2023-12-12 DIAGNOSIS — Z79.899 HIGH RISK MEDICATION USE: Chronic | ICD-10-CM

## 2023-12-12 DIAGNOSIS — G47.33 OSA ON CPAP: Chronic | ICD-10-CM

## 2023-12-12 DIAGNOSIS — I25.83 CORONARY ARTERY DISEASE DUE TO LIPID RICH PLAQUE: Chronic | ICD-10-CM

## 2023-12-12 DIAGNOSIS — I25.10 CORONARY ARTERY DISEASE DUE TO LIPID RICH PLAQUE: Chronic | ICD-10-CM

## 2023-12-12 PROCEDURE — 3079F DIAST BP 80-89 MM HG: CPT | Performed by: NURSE PRACTITIONER

## 2023-12-12 PROCEDURE — 99214 OFFICE O/P EST MOD 30 MIN: CPT | Performed by: NURSE PRACTITIONER

## 2023-12-12 PROCEDURE — 99213 OFFICE O/P EST LOW 20 MIN: CPT | Performed by: NURSE PRACTITIONER

## 2023-12-12 PROCEDURE — 3074F SYST BP LT 130 MM HG: CPT | Performed by: NURSE PRACTITIONER

## 2023-12-12 PROCEDURE — 93005 ELECTROCARDIOGRAM TRACING: CPT | Performed by: NURSE PRACTITIONER

## 2023-12-12 PROCEDURE — 99212 OFFICE O/P EST SF 10 MIN: CPT | Performed by: NURSE PRACTITIONER

## 2023-12-12 ASSESSMENT — ENCOUNTER SYMPTOMS
ORTHOPNEA: 0
SHORTNESS OF BREATH: 0
CLAUDICATION: 0
SENSORY CHANGE: 0
DEPRESSION: 0
EYES NEGATIVE: 1
NERVOUS/ANXIOUS: 0
NEUROLOGICAL NEGATIVE: 1
PND: 0
HALLUCINATIONS: 0
WHEEZING: 0
GASTROINTESTINAL NEGATIVE: 1
RESPIRATORY NEGATIVE: 1
DIZZINESS: 0
PALPITATIONS: 0
BRUISES/BLEEDS EASILY: 0
NAUSEA: 0
MUSCULOSKELETAL NEGATIVE: 1
CONSTITUTIONAL NEGATIVE: 1

## 2023-12-12 ASSESSMENT — FIBROSIS 4 INDEX: FIB4 SCORE: 1.242259987499883165

## 2023-12-12 NOTE — TELEPHONE ENCOUNTER
Prior Authorization for Ezetimibe (Zetia) 10mg tabs has been approved for a quantity of 30 , day supply 30 or patient can fill at [facility] mail order pharmacy for 90 day supply.    Prior Authorization reference number: [id number]  Insurance: [employer]  Effective dates: 12/12/23 - 12/11/24  Copay: $0     Is patient eligible to fill with [facility]? No, test claim rejected stating patient can only fill 30 day supply through local pharmacies and needs to fill at [facility] mail order pharmacy for 90 day maintenance medications..    Next Steps: Will send patient a [employer] message to advise of approval and call his mail order pharmacy to initiate his first fill.

## 2023-12-12 NOTE — TELEPHONE ENCOUNTER
Prior Authorization for Ezetimibe (Zetia) 10mg tabs (Quantity: 90, Days: 90) has been submitted via Phone: [phone]    Insurance: Y[employer]ica COLINDRES    Will follow up in 24-48 business hours.     Patient's insurance does not allow for cover my meds PA to be submitted. Called [employer] and spoke with Kemar (rep) who verbally submitted my PA request.

## 2023-12-12 NOTE — PROGRESS NOTES
Electrophysiology Follow up  Note    DOS: 12/12/2023  [id number]  Chung Griffiths    Chief complaint/Reason for consult: paroxysmal atrial tachycardia    HPI: Pt is a 58 y.o. male who presents to the clinic today in follow up for PAT. Patient has a past medical history significant for but not limited to: CAD S/P DESx2, dyslipidemia, long term antiarrhythmic use, ENRICO on CPAP. Patient was switched from rhtyhmol to Multaq after CAD found and intervened upon. Patient has not yet started on Zetia as there was a problem with prior auth with insurance. Pharmacy consultants brought in and we will get prior auth. Patient goal for long term optimization of his cardiac health needs to have a goal LDL less than 55, especially in light of LAD  that was opened with the use of two stents. The are recommendations from American College of Cardiology for coronary artery disease.       Past Medical History:   Diagnosis Date    Arrhythmia 11/17/2021    SVT    Back pain 1986    Degen Disc. Pinched nerve since 3-21    Coronary artery disease due to lipid rich plaque 6/2/2023    Dyslipidemia     High risk medication use - antiarrhythmic     Hypertriglyceridemia     Indigestion 11/17/2021    occ    ENRICO on CPAP     Pain 11/17/2021    L5-S1 disc    Presence of stent in anterior descending branch of left coronary artery - 2 ARRON with LAD  6/2/2023    Sleep apnea        Past Surgical History:   Procedure Laterality Date    LUMBAR FUSION O-ARM N/A 12/01/2022    Procedure: STAGE#2 LUMBAR 5- SACRAL 1 PERCUTANEOUS FUSION WITH O-ARM;  Surgeon: Jeremy Treviño M.D.;  Location: SURGERY [facility];  Service: Neurosurgery    LUMBAR FUSION ANTERIOR N/A 11/30/2022    Procedure: STAGE#1 ANTERIOR LUMBAR 5-SACRAL 1 INTERBODY FUSION;  Surgeon: Jeremy Treviño M.D.;  Location: [facility];  Service: Neurosurgery    CARDIOVASCULAR  12-3-21    Ablation for SVT    OTHER CARDIAC SURGERY      ablation for SVT    OTHER ORTHOPEDIC SURGERY       right knee    SKELETAL  9-1987 / 12-22    Knee Surgery - ligaments and cartilage / Back Surgery fusion L5-S1       Social History     Socioeconomic History    Marital status:      Spouse name: Not on file    Number of children: Not on file    Years of education: Not on file    Highest education level: Not on file   Occupational History    Not on file   Tobacco Use    Smoking status: Never    Smokeless tobacco: Never   Vaping Use    Vaping Use: Never used   Substance and Sexual Activity    Alcohol use: Yes     Comment: 3-4 drinks/week    Drug use: Never    Sexual activity: Not on file   Other Topics Concern    Not on file   Social History Narrative    Not on file     Social Determinants of Health     Financial Resource Strain: Not on file   Food Insecurity: Not on file   Transportation Needs: Not on file   Physical Activity: Not on file   Stress: Not on file   Social Connections: Not on file   Intimate Partner Violence: Not on file   Housing Stability: Not on file       Family History   Problem Relation Age of Onset    Cancer Maternal Grandmother         Brain Cancer    Lung Disease Maternal Grandfather         Smoker and  by trade during the war    Heart Disease Neg Hx        No Known Allergies    Current Outpatient Medications   Medication Sig Dispense Refill    dronedarone (MULTAQ) 400 MG Tab Take 1 Tablet by mouth 2 times a day with meals. 180 Tablet 3    sildenafil citrate (VIAGRA) 50 MG tablet Take 1 Tablet by mouth 1 time a day as needed for Erectile Dysfunction. 90 Tablet 0    atorvastatin (LIPITOR) 80 MG tablet Take 1 Tablet by mouth every evening. 90 Tablet 3    clopidogrel (PLAVIX) 75 MG Tab Take 1 Tablet by mouth every day. 90 Tablet 3    aspirin (ASA) 81 MG Chew Tab chewable tablet Chew 1 Tablet every day. 100 Tablet 3    metoprolol SR (TOPROL XL) 25 MG TABLET SR 24 HR Take 1 Tablet by mouth 2 times a day. 180 Tablet 3    ezetimibe (ZETIA) 10 MG Tab Take 1 Tablet by mouth every day. (Patient  not taking: Reported on 12/12/2023) 90 Tablet 3     No current facility-administered medications for this visit.       Vitals:    12/12/23 0839   BP: 124/82   Pulse: (!) 52   Resp: 16   SpO2: 98%         Review of Systems   Constitutional: Negative.  Negative for malaise/fatigue.   HENT: Negative.     Eyes: Negative.    Respiratory: Negative.  Negative for shortness of breath and wheezing.    Cardiovascular:  Negative for chest pain, palpitations, orthopnea, claudication, leg swelling and PND.   Gastrointestinal: Negative.  Negative for nausea.   Genitourinary: Negative.    Musculoskeletal: Negative.    Skin: Negative.    Neurological: Negative.  Negative for dizziness and sensory change.   Endo/Heme/Allergies: Negative.  Does not bruise/bleed easily.   Psychiatric/Behavioral:  Negative for depression and hallucinations. The patient is not nervous/anxious.             Prior cath results reviewed:    Left Main:  Distal 10% stenosis              LAD:  100% occluded at the origin              Ramus: 30% mid stenosis              LCx: Minimal luminal irregularities.  Supplies 2 moderate size OM vessels              RCA: Dominant, 30% mid stenosis.  The PDA and PLB have minimal luminal irregularities.    Interventions:  .  Class III angina due to occluded proximal LAD  2.  Successful PCI of the LAD total occlusion using 2 drug-eluting stents, IVUS guidance  3.  Mild elevation LVEDP at 20 mmHg    EKG interpreted by me: Sinus bradycardia    Physical Exam  Constitutional:       Appearance: Normal appearance.   HENT:      Head: Normocephalic.   Eyes:      Pupils: Pupils are equal, round, and reactive to light.   Neck:      Vascular: No JVD.   Cardiovascular:      Rate and Rhythm: Normal rate and regular rhythm.      Pulses: Normal pulses.      Heart sounds: Normal heart sounds.   Pulmonary:      Effort: Pulmonary effort is normal.      Breath sounds: Normal breath sounds.   Abdominal:      General: Abdomen is flat.       "Palpations: Abdomen is soft.   Musculoskeletal:      Cervical back: Normal range of motion.      Right lower leg: No edema.      Left lower leg: No edema.   Skin:     General: Skin is warm and dry.   Neurological:      Mental Status: He is alert and oriented to person, place, and time.   Psychiatric:         Mood and Affect: Mood normal.         Behavior: Behavior normal.          Data:  Lipids:   Lab Results   Component Value Date/Time    CHOLSTRLTOT 139 11/21/2023 08:44 AM    TRIGLYCERIDE 92 11/21/2023 08:44 AM    HDL 50.0 11/21/2023 08:44 AM    LDL 71 11/21/2023 08:44 AM        BMP:  Lab Results   Component Value Date/Time    SODIUM 144 06/02/2023 0730    POTASSIUM 4.6 06/02/2023 0730    CHLORIDE 108 06/02/2023 0730    CO2 24 06/02/2023 0730    GLUCOSE 114 (H) 06/02/2023 0730    BUN 20 06/02/2023 0730    CREATININE 0.85 06/02/2023 0730    CALCIUM 9.1 06/02/2023 0730    ANION 12.0 06/02/2023 0730       GFR:  Lab Results   Component Value Date/Time    IFAFRICA >60 12/07/2021 0120    IFNOTAFR >60 12/07/2021 0120        TSH:   Lab Results   Component Value Date/Time    TSHULTRASEN 4.640 12/06/2021 0128       MAGNESIUM:  Lab Results   Component Value Date/Time    MAGNESIUM 2.3 12/05/2021 1945    MAGNESIUM 2.3 10/11/2021 0750        THYROXINE (T4):   No results found for: \"FREEDIR\"     CBC:   Lab Results   Component Value Date/Time    WBC 7.1 06/02/2023 07:30 AM    RBC 4.63 (L) 06/02/2023 07:30 AM    HEMOGLOBIN 14.3 06/02/2023 07:30 AM    HEMATOCRIT 42.5 06/02/2023 07:30 AM    MCV 91.8 06/02/2023 07:30 AM    MCH 30.9 06/02/2023 07:30 AM    MCHC 33.6 06/02/2023 07:30 AM    RDW 44.9 06/02/2023 07:30 AM    PLATELETCT 162 (L) 06/02/2023 07:30 AM    MPV 9.4 06/02/2023 07:30 AM    NEUTSPOLYS 61.30 11/11/2022 08:35 AM    LYMPHOCYTES 30.60 11/11/2022 08:35 AM    MONOCYTES 6.30 11/11/2022 08:35 AM    EOSINOPHILS 1.10 11/11/2022 08:35 AM    BASOPHILS 0.40 11/11/2022 08:35 AM    IMMGRAN 0.30 11/11/2022 08:35 AM    NRBC 0.00 " "11/11/2022 08:35 AM    NEUTS 4.29 11/11/2022 08:35 AM    LYMPHS 2.14 11/11/2022 08:35 AM    MONOS 0.44 11/11/2022 08:35 AM    EOS 0.08 11/11/2022 08:35 AM    BASO 0.03 11/11/2022 08:35 AM    IMMGRANAB 0.02 11/11/2022 08:35 AM    NRBCAB 0.00 11/11/2022 08:35 AM        CBC w/o DIFF  Lab Results   Component Value Date/Time    WBC 7.1 06/02/2023 07:30 AM    RBC 4.63 (L) 06/02/2023 07:30 AM    HEMOGLOBIN 14.3 06/02/2023 07:30 AM    MCV 91.8 06/02/2023 07:30 AM    MCH 30.9 06/02/2023 07:30 AM    MCHC 33.6 06/02/2023 07:30 AM    RDW 44.9 06/02/2023 07:30 AM    MPV 9.4 06/02/2023 07:30 AM       LIVER:  Lab Results   Component Value Date/Time    ALKPHOSPHAT 84 06/02/2023 07:30 AM    ASTSGOT 15 06/02/2023 07:30 AM    ALTSGPT 20 06/02/2023 07:30 AM    TBILIRUBIN 0.5 06/02/2023 07:30 AM       BNP:  No results found for: \"BNPBTYPENAT\"    PT/INR:  Lab Results   Component Value Date/Time    PROTHROMBTM 13.4 06/02/2023 07:30 AM    PROTHROMBTM 13.0 11/11/2022 08:35 AM    PROTHROMBTM 13.4 12/03/2021 08:20 AM    INR 1.03 06/02/2023 07:30 AM    INR 0.99 11/11/2022 08:35 AM    INR 1.05 12/03/2021 08:20 AM             Impression/Plan:  1. SVT (supraventricular tachycardia)  EKG      2. Coronary artery disease due to lipid rich plaque        3. High risk medication use - antiarrhythmic        4. ENRICO on CPAP         - continue diligent use of CPAP   - start Zetia 10mg daily along with high dose atorvastatin for goal LDL less than 55   - currently not at goal   - repeat lipid panel in 2 months post starting new medication   - continue Multaq for antiarrhythmia   - annual in office EKG for long term AAD medication use   - CMP in 6 months    - goal blood pressure less than 130/80; met   - continue dual antiplatelet therapy until June of next year; long term monotherapy with Plavix    - continue metoprolol for blood pressure, oxygen optimization, and prevention of cardiac remodeling     Lifestyle Modification for better heart health care as " well as beneficial for prevention of worsening cardiac disease. Lifestyle modification discussed includes diet and reduction of sodium. Patients should eat more of a Mediterranean diet and be very careful with how much processed and fast food they eat. Excessive sodium intake can result in fluid retention which can add additional strain to patients cardiac system. Weight loss can also help long term with cardiac health. For patients with BMI above 25kg/m2, studies have shown a greater chance of progression of disease as well as recurrence after interventions. Smoking and vaping should be stopped. Moderation on caffeine and alcohol. Excessive alcohol or caffeine can result in reactions and antagonize the heart system. Patient that fit the matrix for sleep apnea should be referred for a formal study and should try to be compliant with treatment for sleep apnea. Stay hydrated and stay active. Studies have shown that staying physically active can help heart health. Exercise goals should be trying to maintain 120-200 minutes per week of exercise.      A total of 32 minutes of time was spent on day of encounter reviewing medical record, performing history and examination, counseling, ordering medication/test/consults, collaborating with referring service, and documentation.    Roshan Rose AGACNP-EP  Cardiac Electrophysiology

## 2023-12-12 NOTE — TELEPHONE ENCOUNTER
Received [employer] PA request via [employer]  for Ezetimibe (Zetia) 10mg tabs. (Quantity:90, Day Supply:90)     Insurance: [facility]  Member ID:  [id number]  BIN: [id number]  PCN: [id number]  Group: [id number]     Ran Test claim via [place] & medication Rejects stating prior authorization is required.

## 2023-12-12 NOTE — TELEPHONE ENCOUNTER
Medication: Multaq 400mg tabs  Type of Insurance: Commercial  Type of Financial assistance requested Copay Card  Source: Multaq  Source Phone #: [phone]  Outcome: Approved  Effective dates: 12/12/23 until 13 fills  Details/Billing Information:   [id number]  PCN: [id number]  GRP: [id number]  ID: [id number]  Max Award Amount: $1950.00  Final Copay: Unknown. Patient current has met deductible and is paying $0 until January. Last fill with deductible was June 2023 and cost was $163.48 / 30 days per [facility] @ [facility] pharmacy.    Sending patient a [employer] message to provide co-pay card info and PA approval status.

## 2023-12-12 NOTE — TELEPHONE ENCOUNTER
Sent patient a [employer] message with updated information for both Multaq and Ezetimibe. Called [facility] and spoke with Daniella (rep) who stated that the patient can use any [facility] pharmacy to receive a 90 day supply since they do not accept co-pay cards for non-specialty medications. I had Daniella start processing his Ezetimibe prescription fill and verified that it went through for $0 for a 90 day supply. I called the patient and M for him letting him know the updated status with [facility] mail order pharmacy and not being able to use a copay card with them. I asked him to give me a call back for how her would like to proceed and if he wanted to go with [facility] for his Multaq going forward, to let me know which one he would like to have it filled at and I would have his Provider send a new rx for him there.

## 2023-12-23 LAB — EKG IMPRESSION: NORMAL

## 2023-12-23 PROCEDURE — 93010 ELECTROCARDIOGRAM REPORT: CPT | Performed by: INTERNAL MEDICINE

## 2024-02-10 ENCOUNTER — PATIENT MESSAGE (OUTPATIENT)
Dept: CARDIOLOGY | Facility: MEDICAL CENTER | Age: 59
End: 2024-02-10
Payer: COMMERCIAL

## 2024-02-10 DIAGNOSIS — Z79.899 HIGH RISK MEDICATION USE: ICD-10-CM

## 2024-02-10 DIAGNOSIS — I20.0 UNSTABLE ANGINA (HCC): ICD-10-CM

## 2024-02-10 RX ORDER — DRONEDARONE 400 MG/1
400 TABLET, FILM COATED ORAL 2 TIMES DAILY WITH MEALS
Qty: 60 TABLET | Refills: 11 | Status: SHIPPED | OUTPATIENT
Start: 2024-02-10 | End: 2024-02-12

## 2024-02-10 RX ORDER — DRONEDARONE 400 MG/1
400 TABLET, FILM COATED ORAL 2 TIMES DAILY WITH MEALS
Qty: 60 TABLET | Refills: 3 | Status: SHIPPED | OUTPATIENT
Start: 2024-02-10 | End: 2024-02-12

## 2024-05-09 NOTE — THERAPY
PT Contact Note:    PT consult received. Pt is s/p stage I lumbar surgery, pending stage II today. Will initiate PT evaluation upon completion of staged surgeries. Thank you    Kyleigh Francisco, PT, DPT    [employer]alte [id number]   Yes

## 2024-05-27 DIAGNOSIS — I20.0 UNSTABLE ANGINA (HCC): ICD-10-CM

## 2024-12-06 ENCOUNTER — OFFICE VISIT (OUTPATIENT)
Dept: CARDIOLOGY | Facility: MEDICAL CENTER | Age: 59
End: 2024-12-06
Attending: NURSE PRACTITIONER
Payer: COMMERCIAL

## 2024-12-06 VITALS
WEIGHT: 242.6 LBS | RESPIRATION RATE: 16 BRPM | HEIGHT: 73 IN | BODY MASS INDEX: 32.15 KG/M2 | OXYGEN SATURATION: 96 % | SYSTOLIC BLOOD PRESSURE: 105 MMHG | DIASTOLIC BLOOD PRESSURE: 64 MMHG | HEART RATE: 56 BPM

## 2024-12-06 DIAGNOSIS — I47.10 SVT (SUPRAVENTRICULAR TACHYCARDIA) (HCC): ICD-10-CM

## 2024-12-06 DIAGNOSIS — G47.33 OSA ON CPAP: Chronic | ICD-10-CM

## 2024-12-06 DIAGNOSIS — E78.5 DYSLIPIDEMIA: Chronic | ICD-10-CM

## 2024-12-06 DIAGNOSIS — I25.10 CORONARY ARTERY DISEASE DUE TO LIPID RICH PLAQUE: Chronic | ICD-10-CM

## 2024-12-06 DIAGNOSIS — Z79.899 HIGH RISK MEDICATION USE: Chronic | ICD-10-CM

## 2024-12-06 DIAGNOSIS — I25.83 CORONARY ARTERY DISEASE DUE TO LIPID RICH PLAQUE: Chronic | ICD-10-CM

## 2024-12-06 LAB — EKG IMPRESSION: NORMAL

## 2024-12-06 PROCEDURE — 93005 ELECTROCARDIOGRAM TRACING: CPT | Mod: TC | Performed by: NURSE PRACTITIONER

## 2024-12-06 PROCEDURE — 99214 OFFICE O/P EST MOD 30 MIN: CPT | Performed by: NURSE PRACTITIONER

## 2024-12-06 PROCEDURE — 99212 OFFICE O/P EST SF 10 MIN: CPT | Performed by: NURSE PRACTITIONER

## 2024-12-06 PROCEDURE — 3074F SYST BP LT 130 MM HG: CPT | Performed by: NURSE PRACTITIONER

## 2024-12-06 PROCEDURE — 3078F DIAST BP <80 MM HG: CPT | Performed by: NURSE PRACTITIONER

## 2024-12-06 ASSESSMENT — ENCOUNTER SYMPTOMS
DEPRESSION: 0
BRUISES/BLEEDS EASILY: 0
HALLUCINATIONS: 0
NAUSEA: 0
GASTROINTESTINAL NEGATIVE: 1
MUSCULOSKELETAL NEGATIVE: 1
PALPITATIONS: 0
EYES NEGATIVE: 1
PND: 0
RESPIRATORY NEGATIVE: 1
WHEEZING: 0
NEUROLOGICAL NEGATIVE: 1
ORTHOPNEA: 0
SENSORY CHANGE: 0
CONSTITUTIONAL NEGATIVE: 1
SHORTNESS OF BREATH: 0
DIZZINESS: 0
NERVOUS/ANXIOUS: 0
CLAUDICATION: 0

## 2024-12-06 ASSESSMENT — FIBROSIS 4 INDEX: FIB4 SCORE: 1.26

## 2024-12-06 NOTE — ASSESSMENT & PLAN NOTE
- continue Plavix for antiplatelet   - blood pressure stable   - lipid stable    - continue all medications as directed

## 2024-12-06 NOTE — PROGRESS NOTES
Electrophysiology Follow up  Note    DOS: 12/6/2024   [id number]  Chung Grififths    Chief complaint/Reason for consult: CAD: paroxysmal atrial tachycardia    HPI: Pt is a 58 y.o. male who presents to the clinic today in follow up for PAT. Patient has a past medical history significant for but not limited to: CAD S/P DESx2, dyslipidemia, long term antiarrhythmic use, ENRICO on CPAP. Patient doing well. No recent chest pain. Retired. Working on home, more exertional activity. Tolerating Multaq well. EKG stable. Stable interval changes. Repeat lab work next week. Last lipid panel stable. Blood pressure stable. Continue all medications as directed. Annual visit.     Past Medical History:   Diagnosis Date    Arrhythmia 11/17/2021    SVT    Back pain 1986    Degen Disc. Pinched nerve since 3-21    Coronary artery disease due to lipid rich plaque 6/2/2023    Dyslipidemia     High risk medication use - antiarrhythmic     Hypertriglyceridemia     Indigestion 11/17/2021    occ    ENRICO on CPAP     Pain 11/17/2021    L5-S1 disc    Presence of stent in anterior descending branch of left coronary artery - 2 ARRON with LAD  6/2/2023    Sleep apnea        Past Surgical History:   Procedure Laterality Date    LUMBAR FUSION O-ARM N/A 12/01/2022    Procedure: STAGE#2 LUMBAR 5- SACRAL 1 PERCUTANEOUS FUSION WITH O-ARM;  Surgeon: Jeremy Treviño M.D.;  Location: [facility];  Service: Neurosurgery    LUMBAR FUSION ANTERIOR N/A 11/30/2022    Procedure: STAGE#1 ANTERIOR LUMBAR 5-SACRAL 1 INTERBODY FUSION;  Surgeon: Jeremy Treviño M.D.;  Location: [facility];  Service: Neurosurgery    CARDIOVASCULAR  12-3-21    Ablation for SVT    OTHER CARDIAC SURGERY      ablation for SVT    OTHER ORTHOPEDIC SURGERY      right knee    SKELETAL  9-1987 / 12-22    Knee Surgery - ligaments and cartilage / Back Surgery fusion L5-S1       Social History     Socioeconomic History    Marital status:      Spouse name: Not on file     Number of children: Not on file    Years of education: Not on file    Highest education level: Not on file   Occupational History    Not on file   Tobacco Use    Smoking status: Never    Smokeless tobacco: Never   Vaping Use    Vaping status: Never Used   Substance and Sexual Activity    Alcohol use: Yes     Comment: 3-4 drinks/week    Drug use: Never    Sexual activity: Not on file   Other Topics Concern    Not on file   Social History Narrative    Not on file     Social Drivers of Health     Financial Resource Strain: Not on file   Food Insecurity: Not on file   Transportation Needs: Not on file   Physical Activity: Not on file   Stress: Not on file   Social Connections: Not on file   Intimate Partner Violence: Not on file   Housing Stability: Not on file       Family History   Problem Relation Age of Onset    Cancer Maternal Grandmother         Brain Cancer    Lung Disease Maternal Grandfather         Smoker and  by trade during the war    Heart Disease Neg Hx        No Known Allergies    Current Outpatient Medications   Medication Sig Dispense Refill    sildenafil citrate (VIAGRA) 50 MG tablet Take 1 Tablet by mouth 1 time a day as needed for Erectile Dysfunction. 90 Tablet 0    dronedarone (MULTAQ) 400 MG Tab Take 1 Tablet by mouth 2 times a day with meals. 180 Tablet 3    clopidogrel (PLAVIX) 75 MG Tab Take 1 Tablet by mouth every day. 90 Tablet 3    atorvastatin (LIPITOR) 80 MG tablet Take 1 Tablet by mouth every evening. 90 Tablet 3    metoprolol SR (TOPROL XL) 25 MG TABLET SR 24 HR Take 1 Tablet by mouth 2 times a day. 180 Tablet 3    ezetimibe (ZETIA) 10 MG Tab Take 1 Tablet by mouth every day. 90 Tablet 3     No current facility-administered medications for this visit.       Vitals:    12/06/24 0851   BP: 105/64   Pulse: (!) 56   Resp: 16   SpO2: 96%         Review of Systems   Constitutional: Negative.  Negative for malaise/fatigue.   HENT: Negative.     Eyes: Negative.    Respiratory: Negative.   Negative for shortness of breath and wheezing.    Cardiovascular:  Negative for chest pain, palpitations, orthopnea, claudication, leg swelling and PND.   Gastrointestinal: Negative.  Negative for nausea.   Genitourinary: Negative.    Musculoskeletal: Negative.    Skin: Negative.    Neurological: Negative.  Negative for dizziness and sensory change.   Endo/Heme/Allergies: Negative.  Does not bruise/bleed easily.   Psychiatric/Behavioral:  Negative for depression and hallucinations. The patient is not nervous/anxious.             Prior cath results reviewed:    Left Main:  Distal 10% stenosis              LAD:  100% occluded at the origin              Ramus: 30% mid stenosis              LCx: Minimal luminal irregularities.  Supplies 2 moderate size OM vessels              RCA: Dominant, 30% mid stenosis.  The PDA and PLB have minimal luminal irregularities.    Interventions:  .  Class III angina due to occluded proximal LAD  2.  Successful PCI of the LAD total occlusion using 2 drug-eluting stents, IVUS guidance  3.  Mild elevation LVEDP at 20 mmHg    EKG interpreted by me: Sinus bradycardia    Physical Exam  Constitutional:       Appearance: Normal appearance.   HENT:      Head: Normocephalic.   Eyes:      Pupils: Pupils are equal, round, and reactive to light.   Neck:      Vascular: No JVD.   Cardiovascular:      Rate and Rhythm: Normal rate and regular rhythm.      Pulses: Normal pulses.      Heart sounds: Normal heart sounds.   Pulmonary:      Effort: Pulmonary effort is normal.      Breath sounds: Normal breath sounds.   Abdominal:      General: Abdomen is flat.      Palpations: Abdomen is soft.   Musculoskeletal:      Cervical back: Normal range of motion.      Right lower leg: No edema.      Left lower leg: No edema.   Skin:     General: Skin is warm and dry.   Neurological:      Mental Status: He is alert and oriented to person, place, and time.   Psychiatric:         Mood and Affect: Mood normal.          "Behavior: Behavior normal.          Data:  Lipids:   Lab Results   Component Value Date/Time    CHOLSTRLTOT 139 11/21/2023 08:44 AM    TRIGLYCERIDE 92 11/21/2023 08:44 AM    HDL 50.0 11/21/2023 08:44 AM    LDL 71 11/21/2023 08:44 AM        BMP:  Lab Results   Component Value Date/Time    SODIUM 144 06/02/2023 0730    POTASSIUM 4.6 06/02/2023 0730    CHLORIDE 108 06/02/2023 0730    CO2 24 06/02/2023 0730    GLUCOSE 114 (H) 06/02/2023 0730    BUN 20 06/02/2023 0730    CREATININE 0.85 06/02/2023 0730    CALCIUM 9.1 06/02/2023 0730    ANION 12.0 06/02/2023 0730       GFR:  Lab Results   Component Value Date/Time    IFAFRICA >60 12/07/2021 0120    IFNOTAFR >60 12/07/2021 0120        TSH:   Lab Results   Component Value Date/Time    TSHULTRASEN 4.640 12/06/2021 0128       MAGNESIUM:  Lab Results   Component Value Date/Time    MAGNESIUM 2.3 12/05/2021 1945    MAGNESIUM 2.3 10/11/2021 0750        THYROXINE (T4):   No results found for: \"FREEDIR\"     CBC:   Lab Results   Component Value Date/Time    WBC 7.1 06/02/2023 07:30 AM    RBC 4.63 (L) 06/02/2023 07:30 AM    HEMOGLOBIN 14.3 06/02/2023 07:30 AM    HEMATOCRIT 42.5 06/02/2023 07:30 AM    MCV 91.8 06/02/2023 07:30 AM    MCH 30.9 06/02/2023 07:30 AM    MCHC 33.6 06/02/2023 07:30 AM    RDW 44.9 06/02/2023 07:30 AM    PLATELETCT 162 (L) 06/02/2023 07:30 AM    MPV 9.4 06/02/2023 07:30 AM    NEUTSPOLYS 61.30 11/11/2022 08:35 AM    LYMPHOCYTES 30.60 11/11/2022 08:35 AM    MONOCYTES 6.30 11/11/2022 08:35 AM    EOSINOPHILS 1.10 11/11/2022 08:35 AM    BASOPHILS 0.40 11/11/2022 08:35 AM    IMMGRAN 0.30 11/11/2022 08:35 AM    NRBC 0.00 11/11/2022 08:35 AM    NEUTS 4.29 11/11/2022 08:35 AM    LYMPHS 2.14 11/11/2022 08:35 AM    MONOS 0.44 11/11/2022 08:35 AM    EOS 0.08 11/11/2022 08:35 AM    BASO 0.03 11/11/2022 08:35 AM    IMMGRANAB 0.02 11/11/2022 08:35 AM    NRBCAB 0.00 11/11/2022 08:35 AM        CBC w/o DIFF  Lab Results   Component Value Date/Time    WBC 7.1 06/02/2023 07:30 AM " "   RBC 4.63 (L) 06/02/2023 07:30 AM    HEMOGLOBIN 14.3 06/02/2023 07:30 AM    MCV 91.8 06/02/2023 07:30 AM    MCH 30.9 06/02/2023 07:30 AM    MCHC 33.6 06/02/2023 07:30 AM    RDW 44.9 06/02/2023 07:30 AM    MPV 9.4 06/02/2023 07:30 AM       LIVER:  Lab Results   Component Value Date/Time    ALKPHOSPHAT 84 06/02/2023 07:30 AM    ASTSGOT 15 06/02/2023 07:30 AM    ALTSGPT 20 06/02/2023 07:30 AM    TBILIRUBIN 0.5 06/02/2023 07:30 AM       BNP:  No results found for: \"BNPBTYPENAT\"    PT/INR:  Lab Results   Component Value Date/Time    PROTHROMBTM 13.4 06/02/2023 07:30 AM    PROTHROMBTM 13.0 11/11/2022 08:35 AM    PROTHROMBTM 13.4 12/03/2021 08:20 AM    INR 1.03 06/02/2023 07:30 AM    INR 0.99 11/11/2022 08:35 AM    INR 1.05 12/03/2021 08:20 AM             Impression/Plan:  High risk medication use - antiarrhythmic   - EKG stable   - intervals stable   - annual visit with EKG     Dyslipidemia   - repeat lipid panel   - stable LDL   - continue atorvastatin and zetia     Coronary artery disease due to lipid rich plaque   - continue Plavix for antiplatelet   - blood pressure stable   - lipid stable    - continue all medications as directed     ENRICO on CPAP   - continue diligent use       Roshan Rose [facility]  Cardiac Electrophysiology    "

## 2025-02-28 DIAGNOSIS — I47.10 SVT (SUPRAVENTRICULAR TACHYCARDIA) (HCC): ICD-10-CM

## 2025-02-28 DIAGNOSIS — I20.0 UNSTABLE ANGINA (HCC): ICD-10-CM

## 2025-02-28 RX ORDER — DRONEDARONE 400 MG/1
400 TABLET, FILM COATED ORAL 2 TIMES DAILY WITH MEALS
Qty: 180 TABLET | Refills: 0 | Status: SHIPPED | OUTPATIENT
Start: 2025-02-28

## 2025-03-17 NOTE — OP REPORT
Caller: Eloina Bradley    Relationship: Emergency Contact    Best call back number:     [phone]        Requested Prescriptions:   Requested Prescriptions     Pending Prescriptions Disp Refills    Semaglutide, 2 MG/DOSE, (OZEMPIC) 8 MG/3ML solution pen-injector 24 mL 2     Sig: Inject 2 mg under the skin into the appropriate area as directed 1 (One) Time Per Week.        Pharmacy where request should be sent: [address] [phone] [facility] [phone]      Last office visit with prescribing clinician: 2/3/2025   Last telemedicine visit with prescribing clinician: Visit date not found   Next office visit with prescribing clinician: 4/23/2025     Additional details provided by patient: WOULD LIKE TO STAY ON THE 2 MG. TAKES NEXT INJECTION ON 3/24/2025.    Does the patient have less than a 3 day supply:  [] Yes  [x] No    Would you like a call back once the refill request has been completed: [] Yes [x] No    If the office needs to give you a call back, can they leave a voicemail: [] Yes [x] No    Kevin Sanderson Rep   03/17/25 08:58 CDT            Electrophysiology Procedure Report  [facility]    Pre-Operative Diagnosis:  Paroxysmal SVT     Post-Operative Diagnosis:  Atrial tachycardia     Indications: Atrial tachycardia, symptomatic     Procedure(s) Performed: Supraventricular tachycardia ablation with Electrophysiology Study, Electroanatomical 3D mapping, CS/LA pace and record, programmed stimulation after IV drug infusion, intra-cardiac echocardiography, ablation of additional arrhythmia x2.     Physician(s): Michael Pickett M.D.     Anesthesia: General anesthesia    Anesthesiologist: Moderate sedation    Specimen(s) Removed: None     Estimated Blood Loss:  20cc     Complications:  None    Pre-procedure ECG: SR    Post -procedure ECG: SR    Procedure:     After informed written consent, the patient was brought to the EP lab in the fasting, non-sedated state. The patient was prepped and draped in the usual sterile fashion. Femoral venous access was obtained using the modified Seldinger technique. In the right femoral vein, 3 sheaths (8, 8, 8 Fr) were inserted over 0.35” guidewires. A quadrapolar catheter was advanced to the His position, a deflectable decapolar catheter was advanced to the CS position. Programmed stimulation of the right atrium, coronary sinus/left atrium, and right ventricle was performed to induce Atrial tachycardia. Isuprel was given up to 2mcg/min to assist in induction of tachycardia.    We mapped the initial tachycardia, TCL ~290-310, to the lateral right atrium free wall. This area was noted to have a broad early area of activation, with farfield appearing signal. Ablation was performed with a F-J ablation catheter in this area at 30W after pacing to rule out phrenic capture. Ablation in this area did not affect the tachycardia. We opened a Penta-ray to remap, but catheter ectopy terminated the tachycardia.    Atrial tachycardia #2 was then induced, TCL 410ms. This was mapped and found to have an origin more  posteriorly, near the sedrick terminalis. Ablation here at 30W was performed after ruling out phrenic capture. This terminated the arrhythmia but sped up into a different tachycardia, AT #3, TCL 330ms.    We attempted to map AT #3 but we had difficulty with catheter manipulation, the early area appeared to be lower and more annular than AT #1. We ultimately elected to open an ICE catheter for RA visualization. This did reveal a prominent eustachian valve remnant which was making catheter movement difficult. We were able to manipulate catheters to a new area with ICE guidance, which showed earliest signal. Ablation here at 30W immediately terminated the tachycardia.    At this time we went back to perform further consolidation for AT #1, and additional lesions were given along the sedrick which did cause automaticity. This triggered recurrence of AT #1 with TCL 290ms. This was remapped in the right atrium. The earliest signal now appeared to be just under the prior lesion set for this AT, a little lower and more posterior. Ablation at this location terminated the arrhythmia. Consolidation lesions were performed.     At this point, we noted that the pt would spontaneously go into non-sustained slower AT, near the TCL of AT#1. However, mapping now showed that the sedrick terminalis was not early. We mapped and found that the earliest signal for this non-sustained AT was coming from an area posterior to the first lesion set for AT#1 and just superior to the second lesion set for AT #1. Ablation here abolished this non-sustained AT, which was probably originating from the same causative tissue as AT #1.     Post ablation isuprel was given and PES performed and the patient was non inducible.  At the end of the procedure, the catheter and sheaths were removed, and hemostasis was achieved by manual compression. Following recovery from anesthesia, the patient was transferred to the PPU.        Baseline Rhythm: sinus  ms,  AH 88 ms HV 52 ms. No pre-excitation.   LA interval 123 ms, QRS duration 91 ms, QT interval 374 ms    AV Malcom Function:  Wenckebach AV BCL 310ms   AVNERP  < AERP 500/250 ms  VABCL < 250ms     Fluroscopy time: 0min  Ablation time: 367 sec     Impressions:    1. Ablation of atrial tachycardia from 3 foci: Lateral RA free wall, Hillary terminalis, and eustachian valve remnant.  2. Post ablation non inducible    Plan:   1. Admit to monitored bed  2. Bedrest x 2 hours  3. Consider medical therapy with flecainide if recurrent AT

## 2025-03-19 LAB — EKG IMPRESSION: NORMAL

## 2025-05-01 ENCOUNTER — OFFICE VISIT (OUTPATIENT)
Dept: CARDIOLOGY | Facility: MEDICAL CENTER | Age: 60
End: 2025-05-01
Attending: INTERNAL MEDICINE
Payer: COMMERCIAL

## 2025-05-01 ENCOUNTER — TELEPHONE (OUTPATIENT)
Dept: CARDIOLOGY | Facility: MEDICAL CENTER | Age: 60
End: 2025-05-01

## 2025-05-01 VITALS
WEIGHT: 242 LBS | DIASTOLIC BLOOD PRESSURE: 90 MMHG | RESPIRATION RATE: 16 BRPM | HEIGHT: 73 IN | HEART RATE: 53 BPM | BODY MASS INDEX: 32.07 KG/M2 | OXYGEN SATURATION: 96 % | SYSTOLIC BLOOD PRESSURE: 138 MMHG

## 2025-05-01 DIAGNOSIS — I47.10 SVT (SUPRAVENTRICULAR TACHYCARDIA) (HCC): ICD-10-CM

## 2025-05-01 DIAGNOSIS — I25.10 CORONARY ARTERY DISEASE DUE TO LIPID RICH PLAQUE: ICD-10-CM

## 2025-05-01 DIAGNOSIS — E78.5 DYSLIPIDEMIA: ICD-10-CM

## 2025-05-01 DIAGNOSIS — I20.0 UNSTABLE ANGINA (HCC): ICD-10-CM

## 2025-05-01 DIAGNOSIS — I25.83 CORONARY ARTERY DISEASE DUE TO LIPID RICH PLAQUE: ICD-10-CM

## 2025-05-01 PROCEDURE — 99213 OFFICE O/P EST LOW 20 MIN: CPT | Performed by: INTERNAL MEDICINE

## 2025-05-01 PROCEDURE — 99214 OFFICE O/P EST MOD 30 MIN: CPT | Performed by: INTERNAL MEDICINE

## 2025-05-01 PROCEDURE — 99212 OFFICE O/P EST SF 10 MIN: CPT | Performed by: INTERNAL MEDICINE

## 2025-05-01 RX ORDER — EZETIMIBE 10 MG/1
10 TABLET ORAL DAILY
Qty: 90 TABLET | Refills: 3 | Status: SHIPPED | OUTPATIENT
Start: 2025-05-01

## 2025-05-01 RX ORDER — ATORVASTATIN CALCIUM 80 MG/1
80 TABLET, FILM COATED ORAL NIGHTLY
Qty: 90 TABLET | Refills: 3 | Status: SHIPPED | OUTPATIENT
Start: 2025-05-01 | End: 2025-05-18 | Stop reason: SDUPTHER

## 2025-05-01 RX ORDER — ASPIRIN 81 MG/1
81 TABLET ORAL DAILY
COMMUNITY
End: 2025-05-01

## 2025-05-01 RX ORDER — METOPROLOL SUCCINATE 25 MG/1
12.5 TABLET, EXTENDED RELEASE ORAL
Qty: 90 TABLET | Refills: 3 | Status: SHIPPED | OUTPATIENT
Start: 2025-05-01

## 2025-05-01 RX ORDER — CLOPIDOGREL BISULFATE 75 MG/1
75 TABLET ORAL DAILY
Qty: 90 TABLET | Refills: 3 | Status: SHIPPED | OUTPATIENT
Start: 2025-05-01

## 2025-05-01 RX ORDER — DRONEDARONE 400 MG/1
400 TABLET, FILM COATED ORAL 2 TIMES DAILY WITH MEALS
Qty: 180 TABLET | Refills: 3 | Status: SHIPPED | OUTPATIENT
Start: 2025-05-01

## 2025-05-01 ASSESSMENT — FIBROSIS 4 INDEX: FIB4 SCORE: 1.32

## 2025-05-01 NOTE — PATIENT INSTRUCTIONS
A - Antiplatelet - Clopidogrel (PLAVIX) reduces your risk of cardiac event by 27% compared to Aspirin 81 mg daily (HOST EXAM study 2021), prasrugrel (EFFIENT), ticagrelor (BRILINTA)) may be used for the first year.  Aspirin 81 mg daily is associated with a 20% less use of heart event and is used the first year after a cardiac event, stent or CABG.  B - Blood Pressure Control - reduces your risk or heart attack and stroke, the goal is <130/80.  C - Cholesterol Management - statins dramatically reduce your risk; for those that are intolerant to statins, there are alternatives.  D - Diet - MEDITERRANEAN DIET or Cardiac rehab diets, Cardiosmart.org.  E - Exercise - at least 2.5 hours of moderate exercise weekly  (typical brisk walking or similar activity).  F - Fats - VASCEPA, or EPA Fish oil (if Vascepa too expensive) for elevated triglycerides (REDUCE IT trial showed reduction from 22% 5 year MACE to 17%).  G - Good Vibes - meditation, exercise, yoga, Pilates, mindfulness, Chevy-Chi, stress reduction.  H - Heart Failure - betablockers, sucubatril (ENTRESTO) 16% less risk of dying over 3 years, spironolactone, empagliflozin (JARDIANCE) 17% less risk of dying over 2 years, CRT +/- ICD.  I - Inflammation - Colchicine in the LoDoCo2 study in 2020 reduced the risk of heart attack by 30% in 2.5 year follow up.  R - Rehab - Cardiac Rehab reduces risk of dying by 13-24% and need to go to the hospital by 30% within the first year. Compared to regular Cardiac Rehab, Intensive Cardiac Rehab ([place] [facility]) was shown to reduce the risk of major events 17% to 11% and hospitalization for CHF from 8% to 2%. (Nutrients 6518Sxe39(11:4241)  S - Smoking - never smoke, if you do smoke ask for help to quit for good. Patients who quit smoking after heart attack have 36% less likely risk of dying.  Resources are 1-800-QUIT-NOW tocario in addition to Chantix, bupropion (Zyban) or nicotine replacement  T - Type II Diabetes  - pills empagliflozin (JARDIANCE) 38% less risk of dying over 4 years, and/or weekly injections: tirzepatide (Mounjaro), semaglutide (Ozempic), liraglutide (Victoza), dulaglutide (Trulicity) ~26% less risk of MACE in 2 years.  V - Vaccines - Annual flu shot and COVID vaccine reduces the risk of serious cardiovascular complications from these deadly infections.  W - Weight - maintain a healthy weight. Semaglutide (WEGOVY) weekly injection was shown to reduce weight by 10% and heart events by 20% for patients with CAD and BMI > 27 in the SELECT trial (6.5% vs 8% in 48 month follow up [facility] 12/2023).      Work on at least 2.5 - 5 hours a week of moderate exercise    Please look into the following diets and incorporate them into your diet  LOW SALT DIET   KEEP YOUR SODIUM EQUAL TO CALORIES AND NO MORE THAN DOUBLE THE CALORIES FOR A LOW SALT DIET    Cardiosmart.org - great resource for American College of Cardiology on heart disease prevention and treatment    FOR TREATMENT OR PREVENTION OF CORONARY ARTERY DISEASE  These three programs are approved by Medicare/Insurers for those with heart disease  [facility]  Dr. Franklin's Program for Reversing Heart Disease - Flavio Garg's Cardiologist vegetarian-based  [facility] Cardiac Wellness Program - [place]-based mind-body Program    Mediterranean Diet has been shown to be a hearty healthy diet.    This is a commonly referenced Program  Dr Bonilla - Justin over [place] (book and documentary) - vegetarian-based    FOR TREATMENT OF BLOOD PRESSURE  DASH DIET - American Heart Association for treatment of HYPERTENSION    FOR TREATMENT OF BAD CHOLESTEROL/FATS  REDUCE PROCESSED SUGAR AS MUCH AS POSSIBLE  INCREASE WHOLE GRAINS/VEGETABLES  INCREASE FIBER    Lowering total cholesterol and LDL (bad) cholesterol:  - Eat leaner cuts of meat, or eliminate altogether if possible red meat, and frequently substitute fish or chicken.  - Limit saturated  fat to no more than 7-10% of total calories no more than 10 g per day is recommended. Some sources of saturated fat include butter, animal fats, hydrogenated vegetable fats and oils, many desserts, whole milk dairy products.  - Replaced saturated fats with polyunsaturated fats and monounsaturated fats. Foods high in monounsaturated fat include nuts, canola oil, avocados, and olives.  - Limit trans fat (processed foods) and replaced with fresh fruits and vegetables  - Recommend nonfat dairy products  - Increase substantially the amount of soluble fiber intake (legumes such as beans, fruit, whole grains).  - Consider nutritional supplements: plant sterile spreads such as Benecol, fish oil,  flaxseed oil, omega-3 acids EPA capsules 2000 mg twice a day, or viscous fiber such as Metamucil  - Attain ideal weight and regular exercise (at least 30 minutes per day of moderate exercise)  ASK ABOUT STATIN OR NON STATIN MEDICATION TO REDUCE YOUR LDL AND HEART RISK    Lowering triglycerides:  - Reduce intake of simple sugar: Desserts, candy, pastries, honey, sodas, sugared cereals, yogurt, Gatorade, sports bars, canned fruit, smoothies, fruit juice, coffee drinks  - Reduced intake of refined starches: Refined Pasta, most bread  - Reduce or abstain from alcohol  - Increase omega-3 fatty acids: [place], Trout, Mackerel, Herring, Alb[place] tuna and supplements  - Attain ideal weight and regular exercise (at least 30 minutes per day of moderate exercise)  ASK ABOUT PURIFIED OMEGA 3 EPA or FISH OIL TO REDUCE YOUR TG AND HEART RISK    Elevating HDL (good) cholesterol:  - Increase physical activity  - Increase omega-3 fatty acids and supplements as listed above  - Incorporating appropriate amounts of monounsaturated fats such as nuts, olive oil, canola oil, avocados, olives  - Stop smoking  - Attain ideal weight and regular exercise (at least 30 minutes per day of moderate exercise)

## 2025-05-01 NOTE — TELEPHONE ENCOUNTER
I realize his blood pressure was above the lucita.  Please have him check his blood pressures at home and let us know if should be less than 130/80.  I am not sure he would add additional medicine if you are above that but I would certainly advise that if he consistency sees resting blood pressure is high.

## 2025-05-01 NOTE — PROGRESS NOTES
Chief Complaint   Patient presents with    Dyslipidemia    Supraventricular Tachycardia (SVT)    Coronary Artery Disease       Subjective     Chung Griffiths is a 59 y.o. male who presents today CAD post LAD stent 6/2023 with atrial tachycardia s/p ablation but still with SVT     Doing okay past year easy brusing    PCP appropriately cut back on metop to 12.5 BID for low resting rate    May have some claudication    Past Medical History:   Diagnosis Date    Arrhythmia 11/17/2021    SVT    Back pain 1986    Degen Disc. Pinched nerve since 3-21    Coronary artery disease due to lipid rich plaque 6/2/2023    Dyslipidemia     High risk medication use - antiarrhythmic     Hypertriglyceridemia     Indigestion 11/17/2021    occ    [place] on CPAP     Pain 11/17/2021    L5-S1 disc    Presence of stent in anterior descending branch of left coronary artery - 2 ARRON with LAD  6/2/2023    Sleep apnea      Past Surgical History:   Procedure Laterality Date    LUMBAR FUSION O-ARM N/A 12/01/2022    Procedure: STAGE#2 LUMBAR 5- SACRAL 1 PERCUTANEOUS FUSION WITH O-ARM;  Surgeon: Jeremy Treviño M.D.;  Location: SURGERY [facility];  Service: Neurosurgery    LUMBAR FUSION ANTERIOR N/A 11/30/2022    Procedure: STAGE#1 ANTERIOR LUMBAR 5-SACRAL 1 INTERBODY FUSION;  Surgeon: Jeremy Treviño M.D.;  Location: SURGERY [facility];  Service: Neurosurgery    CARDIOVASCULAR  12-3-21    Ablation for SVT    OTHER CARDIAC SURGERY      ablation for SVT    OTHER ORTHOPEDIC SURGERY      right knee    SKELETAL  9-1987 / 12-22    Knee Surgery - ligaments and cartilage / Back Surgery fusion L5-S1     Family History   Problem Relation Age of Onset    Cancer Maternal Grandmother         Brain Cancer    Lung Disease Maternal Grandfather         Smoker and  by trade during the war    Heart Disease Neg Hx      Social History     Socioeconomic History    Marital status:      Spouse name: Not on file    Number of children: Not on file     Years of education: Not on file    Highest education level: Not on file   Occupational History    Not on file   Tobacco Use    Smoking status: Never    Smokeless tobacco: Never   Vaping Use    Vaping status: Never Used   Substance and Sexual Activity    Alcohol use: Yes     Comment: 3-4 drinks/week    Drug use: Never    Sexual activity: Not on file   Other Topics Concern    Not on file   Social History Narrative    Not on file     Social Drivers of Health     Financial Resource Strain: Not on file   Food Insecurity: Not on file   Transportation Needs: Not on file   Physical Activity: Not on file   Stress: Not on file   Social Connections: Not on file   Intimate Partner Violence: Not on file   Housing Stability: Not on file     No Known Allergies  Outpatient Encounter Medications as of 5/1/2025   Medication Sig Dispense Refill    dronedarone (MULTAQ) 400 MG Tab Take 1 Tablet by mouth 2 times a day with meals. 180 Tablet 3    atorvastatin (LIPITOR) 80 MG tablet Take 1 Tablet by mouth every evening. 90 Tablet 3    clopidogrel (PLAVIX) 75 MG Tab Take 1 Tablet by mouth every day. 90 Tablet 3    ezetimibe (ZETIA) 10 MG Tab Take 1 Tablet by mouth every day. 90 Tablet 3    metoprolol SR (TOPROL XL) 25 MG TABLET SR 24 HR Take 1 Tablet by mouth 2 times a day. (Patient taking differently: Take 25 mg by mouth 2 times a day. 12.5mg BID) 180 Tablet 3    [DISCONTINUED] aspirin 81 MG EC tablet Take 81 mg by mouth every day.      [DISCONTINUED] MULTAQ 400 MG Tab TAKE 1 TABLET BY MOUTH TWICE DAILY WITH MEALS 180 Tablet 0    [DISCONTINUED] sildenafil citrate (VIAGRA) 50 MG tablet Take 1 Tablet by mouth 1 time a day as needed for Erectile Dysfunction. 90 Tablet 0    [DISCONTINUED] clopidogrel (PLAVIX) 75 MG Tab Take 1 Tablet by mouth every day. 90 Tablet 3    [DISCONTINUED] atorvastatin (LIPITOR) 80 MG tablet Take 1 Tablet by mouth every evening. 90 Tablet 3    [DISCONTINUED] ezetimibe (ZETIA) 10 MG Tab Take 1 Tablet by mouth every  "day. 90 Tablet 3     No facility-administered encounter medications on file as of 2025.     ROS           Objective     BP (!) 138/90 (BP Location: Left arm, Patient Position: Sitting, BP Cuff Size: Adult)   Pulse (!) 53   Resp 16   Ht 1.854 m (6' 1\")   Wt 110 kg (242 lb)   SpO2 96%   BMI 31.93 kg/m²     Physical Exam  Constitutional:       General: He is not in acute distress.     Appearance: He is not diaphoretic.   Eyes:      General: No scleral icterus.  Neck:      Vascular: No JVD.   Cardiovascular:      Rate and Rhythm: Normal rate.      Heart sounds: Normal heart sounds. No murmur heard.     No friction rub. No gallop.   Pulmonary:      Effort: No respiratory distress.      Breath sounds: No wheezing or rales.   Abdominal:      General: Bowel sounds are normal.      Palpations: Abdomen is soft.   Musculoskeletal:      Right lower leg: No edema.      Left lower leg: No edema.   Skin:     Findings: No rash.   Neurological:      Mental Status: He is alert. Mental status is at baseline.   Psychiatric:         Mood and Affect: Mood normal.            We reviewed in person the most recent labs  Recent Results (from the past 30 weeks)   EKG    Collection Time: 24  9:09 AM   Result Value Ref Range    Report       [facility]    Test Date:  2024  Pt Name:    LANRE DAMON                Department: [facility]  MRN:        [id number]                      Room:  Gender:     Male                         Technician: JOSE  :        1965                   Requested By:Kiara Zavala MD  Order #:    [id number]                    Reading MD: Kiara Zavala MD    Measurements  Intervals                                Axis  Rate:       56                           P:          10  FL:         156                          QRS:        -87  QRSD:       113                          T:          54  QT:         473  QTc:        457    Interpretive Statements  Sinus bradycardia  Left anterior " fascicular block  Compared to ECG 12/12/2023 08:47:40  No significant changes  Electronically Signed On 03- 10:33:20 PDT by Kiara Zavala MD     Lipid Profile    Collection Time: 12/12/24  8:20 AM   Result Value Ref Range    Cholesterol,Tot 121 120 - 200 mg/dL    Triglycerides 91 0 - 150 mg/dL    LDL 49 <100 mg/dL    HDL 54.0 40.0 - 60.0 mg/dL    Chol-Hdl Ratio 2.24     Non HDL Cholesterol 67 30 - 160   Comp Metabolic Panel    Collection Time: 12/12/24  8:20 AM   Result Value Ref Range    Sodium 142 136 - 145 mmol/L    Potassium 4.3 3.5 - 5.1 mmol/L    Chloride 108 (H) 98 - 107 mmol/L    Co2 25 21 - 32 mmol/L    Anion Gap 13 10 - 18 mmol/L    Glucose 108 (H) 74 - 99 mg/dL    Bun 15 7 - 18 mg/dL    Creatinine 1.0 0.8 - 1.3 mg/dL    Calcium 9.0 8.5 - 11.0 mg/dL    AST(SGOT) 26 15 - 37 U/L    ALT(SGPT) 44 12 - 78 U/L    Alkaline Phosphatase 103 46 - 116 U/L    Total Bilirubin 1.0 0.2 - 1.0 mg/dL    Albumin 3.7 3.4 - 5.0 g/dL    Total Protein 6.8 6.4 - 8.2 g/dL    A-G Ratio 1.2    CBC WITHOUT DIFFERENTIAL    Collection Time: 12/12/24  8:20 AM   Result Value Ref Range    WBC 7.5 4.8 - 10.8 K/uL    RBC 4.47 (L) 4.70 - 6.10 M/uL    Hemoglobin 14.2 14.0 - 18.0 g/dL    Hematocrit 41.0 (L) 42.0 - 52.0 %    MCV 91.7 80.0 - 94.0 fL    MCH 31.8 (H) 27.0 - 31.0 pg    MCHC 34.6 33.0 - 37.0 g/dL    RDW 13.1 11.5 - 14.5 %    Platelet Count 181 130 - 400 K/uL    MPV 9.7 7.4 - 10.4 fL   ESTIMATED GFR    Collection Time: 12/12/24  8:20 AM   Result Value Ref Range    GFR (CKD-EPI) 85 >60 mL/min/1.73 m 2         Assessment & Plan     1. Dyslipidemia  LIPID PANEL    ezetimibe (ZETIA) 10 MG Tab      2. Coronary artery disease due to lipid rich plaque  CBC WITH DIFFERENTIAL    COMP METABOLIC PANEL      3. Unstable angina (HCC)  dronedarone (MULTAQ) 400 MG Tab    atorvastatin (LIPITOR) 80 MG tablet    clopidogrel (PLAVIX) 75 MG Tab      4. SVT (supraventricular tachycardia) (HCC)  dronedarone (MULTAQ) 400 MG Tab          Medical  Decision Making: Today's Assessment/Status/Plan:        It was my pleasure to meet with Mr. Griffiths.    We addressed the management of hypertension at today's visit. Blood pressure is well controlled.  We specifically assessed the labs on hypertension treatment    We addressed the management of dyslipidemia and atherosclerosis at today's visit. He is on appropriate lipid lowering medication.    We addressed the management of atherosclerotic artery disease.  He is on proper antiplatelet, cholesterol management as appropriate.  We addressed the potential side effects and laboratory follow-up for these medications.    SWITCH TO PLAVIX MONOTHERAPY    Multaq has done well for SVT    LOW THRESHOLD FOR GOLD    I will see Mr. Griffiths back in 1 year time and encouraged him to follow up with us over the phone or electronically using my [employer] as issues arise.    It is my pleasure to participate in the care of Mr. Griffiths.  Please do not hesitate to contact me with questions or concerns.    Javi Downey MD PhD [facility]  Cardiologist [facility]    Please note that this dictation was created using voice recognition software. There may be errors I did not discover before finalizing the note.     () Today's E/M visit is associated with medical care services that serve as the continuing focal point for all needed health care services and/or with medical care services that  are part of ongoing care related to a patient's single, serious condition, or a complex condition: This includes  furnishing services to patients on an ongoing basis that result in care that is personalized  to the patient. The services result in a comprehensive, longitudinal, and continuous  relationship with the patient and involve delivery of team-based care that is accessible, coordinated with other practitioners and providers, and integrated with the broader health  care landscape.

## 2025-05-01 NOTE — TELEPHONE ENCOUNTER
Phone Number Called: [phone]    Call outcome: Spoke to patient regarding message below.    Message: Called to discuss. Pt denies taking BP at home, advised Satya pt one. He will let us know if he runs high or low.      /90 at OV today

## 2025-05-06 ENCOUNTER — PATIENT MESSAGE (OUTPATIENT)
Dept: CARDIOLOGY | Facility: MEDICAL CENTER | Age: 60
End: 2025-05-06
Payer: COMMERCIAL

## 2025-05-09 ENCOUNTER — PATIENT MESSAGE (OUTPATIENT)
Dept: CARDIOLOGY | Facility: MEDICAL CENTER | Age: 60
End: 2025-05-09
Payer: COMMERCIAL

## 2025-05-09 VITALS — DIASTOLIC BLOOD PRESSURE: 70 MMHG | SYSTOLIC BLOOD PRESSURE: 126 MMHG

## 2025-05-18 DIAGNOSIS — I20.0 UNSTABLE ANGINA (HCC): ICD-10-CM

## 2025-05-19 ENCOUNTER — PATIENT MESSAGE (OUTPATIENT)
Dept: CARDIOLOGY | Facility: MEDICAL CENTER | Age: 60
End: 2025-05-19
Payer: COMMERCIAL

## 2025-05-19 VITALS — DIASTOLIC BLOOD PRESSURE: 76 MMHG | SYSTOLIC BLOOD PRESSURE: 120 MMHG

## 2025-05-19 RX ORDER — ATORVASTATIN CALCIUM 80 MG/1
80 TABLET, FILM COATED ORAL NIGHTLY
Qty: 90 TABLET | Refills: 3 | Status: SHIPPED | OUTPATIENT
Start: 2025-05-19

## 2025-05-19 NOTE — TELEPHONE ENCOUNTER
Is the patient due for a refill? Yes  Requested change in pharmacy  Was the patient seen the last 15 months? Yes    Date of last office visit: 05.01.2025    Does the patient have an upcoming appointment?  No    Provider to refill:CW    Does the patient have FDC Plus and need 100-day supply? (This applies to ALL medications) Patient does not have SCP

## 2025-05-29 DIAGNOSIS — I20.0 UNSTABLE ANGINA (HCC): ICD-10-CM

## 2025-06-02 ENCOUNTER — RESULTS FOLLOW-UP (OUTPATIENT)
Dept: CARDIOLOGY | Facility: MEDICAL CENTER | Age: 60
End: 2025-06-02

## 2025-06-02 DIAGNOSIS — I20.0 UNSTABLE ANGINA (HCC): ICD-10-CM

## 2025-06-02 DIAGNOSIS — I25.83 CORONARY ARTERY DISEASE DUE TO LIPID RICH PLAQUE: ICD-10-CM

## 2025-06-02 DIAGNOSIS — I25.10 CORONARY ARTERY DISEASE DUE TO LIPID RICH PLAQUE: ICD-10-CM

## 2025-06-02 DIAGNOSIS — I47.10 SVT (SUPRAVENTRICULAR TACHYCARDIA) (HCC): Primary | ICD-10-CM

## 2025-06-03 RX ORDER — CLOPIDOGREL BISULFATE 75 MG/1
75 TABLET ORAL DAILY
Qty: 90 TABLET | Refills: 3 | OUTPATIENT
Start: 2025-06-03

## 2025-06-03 NOTE — TELEPHONE ENCOUNTER
Refill request denied. Plavix last refilled 5/1/25 for a full year supply to [facility] pharmacy in [place], [place] by CW. Per [employer] message refill request his preferred pharmacy is [facility] in [place], [place]. Called pt to clarify which pharmacy he would prefer. See phone call below.     Phone Number Called: [phone]  Call outcome: Spoke to patient regarding message below.  S/w pt regarding recent plavix refill on 5/1/25 for full year supply sent to [facility] pharmacy in [place], [place]. Pt states he does not mind which pharmacy his plavix refill goes to as long as he can get it quickly because he has been out for about a week. Per pt [facility] pharmacy usually will text him if he has a refill ready for pick-up but did not receive any messaging. Advised pt that the [facility] pharmacy in [place] should have his active refills and to reach out to them directly to inquire about pick-up for plavix. Instructed pt to call back if he still runs into any issues getting this rx.

## 2025-06-04 RX ORDER — CLOPIDOGREL BISULFATE 75 MG/1
75 TABLET ORAL DAILY
Qty: 90 TABLET | Refills: 3 | Status: SHIPPED | OUTPATIENT
Start: 2025-06-04

## 2025-06-18 ENCOUNTER — PATIENT MESSAGE (OUTPATIENT)
Dept: CARDIOLOGY | Facility: MEDICAL CENTER | Age: 60
End: 2025-06-18
Payer: COMMERCIAL

## 2025-06-18 ENCOUNTER — TELEPHONE (OUTPATIENT)
Dept: CARDIOLOGY | Facility: MEDICAL CENTER | Age: 60
End: 2025-06-18
Payer: COMMERCIAL

## 2025-06-18 PROBLEM — S46.219A TEAR OF DISTAL TENDON OF BICEPS: Status: ACTIVE | Noted: 2025-06-18

## 2025-06-18 NOTE — LETTER
PROCEDURE/SURGERY CLEARANCE FORM    Date: 6/18/2025   Patient Name: Chung Griffiths    Dear Surgeon or Proceduralist,     The above patient is cleared to have the following procedure/surgery: Right open repair distal bicep tendon, repair as indicated   Additional comments: N/A      Thank you for your request for cardiac stratification of our mutual patient Chung Griffiths 1965. We have [facility]; and to the best of our understanding this patient has not had stenting, ablation, watchman, cardiothoracic surgery or hospitalization for cardiovascular reasons in the past 6 months.  Chung Griffiths has been seen within the past 15 months and is considered to have non-modifiable cardiac risk for this low-risk procedure/surgery. They may proceed from a cardiovascular standpoint and may hold their antiplatelet/anticoagulation as briefly as possible. Please have patient resume this medication when hemodynamically stable to do so.     Aspirin or Prasugrel   - hold 7 days prior to procedure/surgery, resume when hemodynamically stable      Clopidrogrel or Ticagrelor  - hold 7 days for all neurological procedures, hold 5 days prior to all other procedure/surgery,  resume when hemodynamically stable     Warfarin - hold 7 days for all neurological procedures, hold 5 days prior to all other procedure/surgery and coordinate with [facility] ([phone]) INR testing and dose management.      Pradaxa/Xarelto/Eliquis/Savesya - hold 1 day prior to procedure for low bleeding risk procedure, 2 days for high bleeding risk procedure, or consider holding 3 days or longer for patients with reduced kidney function (CrCl <30mL/min) or spinal/cranial surgeries/procedures.      If they have a mechanical heart valve, please coordinate with [facility] ([phone]) the proper management of their anticoagulant in the periprocedural or perioperative period.      Some  patients have higher risk for cardiovascular complications or holding medication. If our patient has had prior complications of holding antiplatelet or anticoagulants in the past and we have seen them after these events, we have addressed these concerns with the patient. They are at an unknown degree of increased risk for recurrent complication.  You may hold anticoagulation/antiplatelets for the procedure or surgery if the benefits of the procedure or surgery outweigh this nonmodifiable risk.      If Chung Griffiths 1965 has new symptoms of heart failure decompensation, unstable arrythmia, or angina please reach out and we will assess the patient.      If you have other patient-specific concerns, please feel free to reach out to the patient's cardiologist directly at [phone].     Thank you,   [facility]    __ _Electronically signed________  Provider: Javi Downey MD PhD FACC

## 2025-06-18 NOTE — TELEPHONE ENCOUNTER
Last OV: 5/1/2025  Proposed Surgery: Right open repair distal bicep tendon, repair as indicated   Surgery Date: TBD   Requesting Office Name: [email or web address]   Fax Number: [phone]  Preference of Location (default is surgery center unless specified by Cardiologist or CLIFF)  Prior Clearance Addressed: No    Is this a general clearance? YES   Anticoags/Antiplatelets: Clopidogrel   Anticoags/Antiplatelet managed by Cardiology? YES    Outstanding Cardiac Imaging : No  Ablation, Cardioversion, Stent, Cardiac Devices, Catheterization, Watchman: Yes  Date : 6/2/2023   TAVR/Valve, Mitral Clip, Watchman (including open heart),: N/A   Recent Cardiac Hospitalization: No            When: N/A  History (cardiac history): Past Medical History[1]        Is this a dental clearance? NO  Ablation, Cardioversion, Watchman, Stents, Cath, Devices within the last 3 months? No   If yes- Send dental wait letter, do not forward to provider for review.     TAVR / Valve, Mitral clip within the last 6 months? No  If yes- Send dental wait letter, do not forward to provider for review.     If completing a general clearance, continue per protocol.           Surgical Clearance Letter Sent: YES   **Scan clearance request letter into [facility].**         [1]   Past Medical History:  Diagnosis Date    Arrhythmia 11/17/2021    SVT    Back pain 1986    Degen Disc. Pinched nerve since 3-21    Coronary artery disease due to lipid rich plaque 6/2/2023    Dyslipidemia     High risk medication use - antiarrhythmic     Hypertriglyceridemia     Indigestion 11/17/2021    occ    [place] on CPAP     Pain 11/17/2021    L5-S1 disc    Presence of stent in anterior descending branch of left coronary artery - 2 ARRON with LAD  6/2/2023    Sleep apnea

## (undated) DEVICE — SLEEVE, VASO, THIGH, MED

## (undated) DEVICE — FORCEPS ELECTROSURGICAL DISPOSABLE CODMAN 8IN 1.5MM

## (undated) DEVICE — SUCTION INSTRUMENT YANKAUER BULBOUS TIP W/O VENT (50EA/CA)

## (undated) DEVICE — DRAPE LARGE 3 QUARTER - (20/CA)

## (undated) DEVICE — TOWEL STOP TIMEOUT SAFETY FLAG (40EA/CA)

## (undated) DEVICE — SUTURE 3-0 VICRYL PLUS RB-1 - 8 X 18 INCH (12/BX)

## (undated) DEVICE — SUTURE 1 PDS II PLUS TP-1 - (12PK/BX)

## (undated) DEVICE — GLOVE BIOGEL INDICATOR SZ 8.5 SURGICAL PF LTX - (50/BX 4BX/CA)

## (undated) DEVICE — SUTURE 0 VICRYL PLUS UR-6 - 27 INCH (36/BX)

## (undated) DEVICE — SPONGE KITTNER DISSECTORS - (5EA/PK 50PK/CA)

## (undated) DEVICE — ELECTRODE DUAL RETURN W/ CORD - (50/PK)

## (undated) DEVICE — MARKERS XVS WITH XLINK KIT

## (undated) DEVICE — GLOVE BIOGEL SZ 7 SURGICAL PF LTX - (50PR/BX 4BX/CA)

## (undated) DEVICE — DRESSING NON-ADHERING 8 X 3 - (50/BX)

## (undated) DEVICE — GLOVE BIOGEL SZ 8 SURGICAL PF LTX - (50PR/BX 4BX/CA)

## (undated) DEVICE — TRANSDUCER ADULT DISP. SINGLE BONDED STERILE - (20EA/CA)

## (undated) DEVICE — INTRAOP NEURO IN OR 1:1 PER 15 MIN

## (undated) DEVICE — HEADREST PRONEVIEW LARGE - (10/CA)

## (undated) DEVICE — SUTURE 2-0 SILK 12 REEL (12PK/BX)"

## (undated) DEVICE — GLOVE BIOGEL SZ 6.5 SURGICAL PF LTX (50PR/BX 4BX/CA)

## (undated) DEVICE — TOOL MR8 21CM MATCH HEAD 3MM DIAMETER (1/EA)

## (undated) DEVICE — Device

## (undated) DEVICE — CLIP MED INTNL HRZN TI ESCP - (25/BX)

## (undated) DEVICE — GOWN WARMING STANDARD FLEX - (30/CA)

## (undated) DEVICE — SUCTION TIP STRAIGHT ARGYLE - 50EA/CA

## (undated) DEVICE — BOVIE BLADE COATED &INSULATED - 25/PK

## (undated) DEVICE — GLOVE BIOGEL INDICATOR SZ 8 SURGICAL PF LTX - (50/BX 4BX/CA)

## (undated) DEVICE — CANISTER SUCTION 3000ML MECHANICAL FILTER AUTO SHUTOFF MEDI-VAC NONSTERILE LF DISP  (40EA/CA)

## (undated) DEVICE — PACK NEURO - (2EA/CA)

## (undated) DEVICE — CLIP LG INTNL HRZN TI ESCP LGT - (20/BX)

## (undated) DEVICE — SPONGE RADIOPAQUE CTN X-LG - STERILE (50PK/CA) MADE TO ORDER ITEM AND HAS A 4-6 WEEK LEAD TIME

## (undated) DEVICE — CLOSURE SKIN STRIP 1/2 X 4 IN - (STERI STRIP) (50/BX 4BX/CA)

## (undated) DEVICE — SPONGE GAUZESTER 4 X 4 4PLY - (128PK/CA)

## (undated) DEVICE — SYRINGE NON SAFETY 10 CC 20 GA X 1-1/2 IN (100/BX 4BX/CA)

## (undated) DEVICE — BLADE SURGICAL CLIPPER - (50EA/CA)

## (undated) DEVICE — SET LEADWIRE 5 LEAD BEDSIDE DISPOSABLE ECG (1SET OF 5/EA)

## (undated) DEVICE — SENSOR OXIMETER ADULT SPO2 RD SET (20EA/BX)

## (undated) DEVICE — DRAPE STRLE REG TOWEL 18X24 - (10/BX 4BX/CA)"

## (undated) DEVICE — PAD LAP STERILE 18 X 18 - (5/PK 40PK/CA)

## (undated) DEVICE — GLOVE BIOGEL INDICATOR SZ 6.5 SURGICAL PF LTX - (50PR/BX 4BX/CA)

## (undated) DEVICE — BOVIE  BLADE 6 EXTENDED - (50/PK)

## (undated) DEVICE — CHLORAPREP 26 ML APPLICATOR - ORANGE TINT(25/CA)

## (undated) DEVICE — COVER MAYO STAND X-LG - (22EA/CA)

## (undated) DEVICE — KIT SURGIFLO W/OUT THROMBIN - (6EA/CA)

## (undated) DEVICE — DECANTER FLD BLS - (50/CA)

## (undated) DEVICE — CELLSAVER PACK

## (undated) DEVICE — SET EXTENSION WITH 2 PORTS (48EA/CA) ***PART #2C8610 IS A SUBSTITUTE*****

## (undated) DEVICE — TOWELS CLOTH SURGICAL - (4/PK 20PK/CA)

## (undated) DEVICE — CORDS BIPOLAR COAGULATION - 12FT STERILE DISP. (10EA/BX)

## (undated) DEVICE — FORCEPS ELECTROSURGICAL DISPOSABLE CODMAN 9IN 1.5MM

## (undated) DEVICE — BOVIE BLADE COATED &INSULATED (50EA/PK)

## (undated) DEVICE — BAG SPONGE COUNT 10.25 X 32 - BLUE (250/CA)

## (undated) DEVICE — ARMREST CRADLE FOAM - (2PR/PK 12PR/CA)

## (undated) DEVICE — PEN SKIN MARKER W/RULER - (50EA/BX)

## (undated) DEVICE — TUBING C&T SET FLYING LEADS DRAIN TUBING (10EA/BX)

## (undated) DEVICE — GVL 4 STAT DISPOSABLE - (10/BX)

## (undated) DEVICE — LACTATED RINGERS INJ 1000 ML - (14EA/CA 60CA/PF)

## (undated) DEVICE — COVER LIGHT HANDLE ALC PLUS DISP (18EA/BX)

## (undated) DEVICE — CELLSAVER STAT

## (undated) DEVICE — BLADE SURGICAL #15 - (50/BX 3BX/CA)

## (undated) DEVICE — SOD. CHL. INJ. 0.9% 1000 ML - (14EA/CA 60CA/PF)

## (undated) DEVICE — PIN PERC 150MM (5EA/PK)

## (undated) DEVICE — TUBING CLEARLINK DUO-VENT - C-FLO (48EA/CA)

## (undated) DEVICE — GLOVE SZ 8 BIOGEL PI MICRO - PF LF (50PR/BX)

## (undated) DEVICE — DRAPE LAPAROTOMY T SHEET - (12EA/CA)

## (undated) DEVICE — TRAY CATHETER FOLEY URINE METER W/STATLOCK 350ML (10EA/CA)

## (undated) DEVICE — SUTURE GENERAL

## (undated) DEVICE — LACTATED RINGERS INJ. 500 ML - (24EA/CA)

## (undated) DEVICE — SUTURE 1 VICRYL PLUS CTX - 8 X 18 INCH (12/BX)

## (undated) DEVICE — SODIUM CHL IRRIGATION 0.9% 1000ML (12EA/CA)

## (undated) DEVICE — GLOVE SZ 6.5 BIOGEL PI MICRO - PF LF (50PR/BX)

## (undated) DEVICE — CLIP MED LG INTNL HRZN TI ESCP - (20/BX)